# Patient Record
Sex: MALE | Race: OTHER | HISPANIC OR LATINO
[De-identification: names, ages, dates, MRNs, and addresses within clinical notes are randomized per-mention and may not be internally consistent; named-entity substitution may affect disease eponyms.]

---

## 2018-03-22 ENCOUNTER — APPOINTMENT (OUTPATIENT)
Dept: INTERNAL MEDICINE | Facility: CLINIC | Age: 43
End: 2018-03-22

## 2018-03-22 ENCOUNTER — LABORATORY RESULT (OUTPATIENT)
Age: 43
End: 2018-03-22

## 2018-03-22 ENCOUNTER — OUTPATIENT (OUTPATIENT)
Dept: OUTPATIENT SERVICES | Facility: HOSPITAL | Age: 43
LOS: 1 days | End: 2018-03-22
Payer: SELF-PAY

## 2018-03-22 VITALS
SYSTOLIC BLOOD PRESSURE: 120 MMHG | BODY MASS INDEX: 25.71 KG/M2 | HEIGHT: 73 IN | WEIGHT: 194 LBS | DIASTOLIC BLOOD PRESSURE: 90 MMHG

## 2018-03-22 DIAGNOSIS — H02.402 UNSPECIFIED PTOSIS OF LEFT EYELID: ICD-10-CM

## 2018-03-22 DIAGNOSIS — K42.9 UMBILICAL HERNIA WITHOUT OBSTRUCTION OR GANGRENE: Chronic | ICD-10-CM

## 2018-03-22 DIAGNOSIS — I10 ESSENTIAL (PRIMARY) HYPERTENSION: ICD-10-CM

## 2018-03-22 LAB
HCT VFR BLD CALC: 48.4 % — SIGNIFICANT CHANGE UP (ref 39–50)
HGB BLD-MCNC: 15.9 G/DL — SIGNIFICANT CHANGE UP (ref 13–17)
MCHC RBC-ENTMCNC: 30 PG — SIGNIFICANT CHANGE UP (ref 27–34)
MCHC RBC-ENTMCNC: 32.9 GM/DL — SIGNIFICANT CHANGE UP (ref 32–36)
MCV RBC AUTO: 91.3 FL — SIGNIFICANT CHANGE UP (ref 80–100)
NRBC # BLD: 0 /100 WBCS — SIGNIFICANT CHANGE UP (ref 0–0)
PLATELET # BLD AUTO: 265 K/UL — SIGNIFICANT CHANGE UP (ref 150–400)
RBC # BLD: 5.3 M/UL — SIGNIFICANT CHANGE UP (ref 4.2–5.8)
RBC # FLD: 12.7 % — SIGNIFICANT CHANGE UP (ref 10.3–14.5)
WBC # BLD: 7.37 K/UL — SIGNIFICANT CHANGE UP (ref 3.8–10.5)
WBC # FLD AUTO: 7.37 K/UL — SIGNIFICANT CHANGE UP (ref 3.8–10.5)

## 2018-03-22 PROCEDURE — 85027 COMPLETE CBC AUTOMATED: CPT

## 2018-03-22 PROCEDURE — 82607 VITAMIN B-12: CPT

## 2018-03-22 PROCEDURE — G0463: CPT

## 2018-03-22 PROCEDURE — 80053 COMPREHEN METABOLIC PANEL: CPT

## 2018-03-22 PROCEDURE — 83036 HEMOGLOBIN GLYCOSYLATED A1C: CPT

## 2018-03-22 PROCEDURE — 80061 LIPID PANEL: CPT

## 2018-03-23 LAB
ALBUMIN SERPL ELPH-MCNC: 4.7 G/DL — SIGNIFICANT CHANGE UP (ref 3.3–5)
ALP SERPL-CCNC: 62 U/L — SIGNIFICANT CHANGE UP (ref 40–120)
ALT FLD-CCNC: 37 U/L — SIGNIFICANT CHANGE UP (ref 10–45)
ANION GAP SERPL CALC-SCNC: 16 MMOL/L — SIGNIFICANT CHANGE UP (ref 5–17)
AST SERPL-CCNC: 46 U/L — HIGH (ref 10–40)
BILIRUB SERPL-MCNC: 0.6 MG/DL — SIGNIFICANT CHANGE UP (ref 0.2–1.2)
BUN SERPL-MCNC: 25 MG/DL — HIGH (ref 7–23)
CALCIUM SERPL-MCNC: 9.8 MG/DL — SIGNIFICANT CHANGE UP (ref 8.4–10.5)
CHLORIDE SERPL-SCNC: 100 MMOL/L — SIGNIFICANT CHANGE UP (ref 96–108)
CHOLEST SERPL-MCNC: 199 MG/DL — SIGNIFICANT CHANGE UP (ref 10–199)
CO2 SERPL-SCNC: 25 MMOL/L — SIGNIFICANT CHANGE UP (ref 22–31)
CREAT SERPL-MCNC: 0.94 MG/DL — SIGNIFICANT CHANGE UP (ref 0.5–1.3)
FOLATE SERPL-MCNC: >20 NG/ML — SIGNIFICANT CHANGE UP (ref 4.8–24.2)
GLUCOSE SERPL-MCNC: 78 MG/DL — SIGNIFICANT CHANGE UP (ref 70–99)
HBA1C BLD-MCNC: 5.7 % — HIGH (ref 4–5.6)
HDLC SERPL-MCNC: 37 MG/DL — LOW (ref 40–125)
LIPID PNL WITH DIRECT LDL SERPL: 140 MG/DL — HIGH
POTASSIUM SERPL-MCNC: 4.3 MMOL/L — SIGNIFICANT CHANGE UP (ref 3.5–5.3)
POTASSIUM SERPL-SCNC: 4.3 MMOL/L — SIGNIFICANT CHANGE UP (ref 3.5–5.3)
PROT SERPL-MCNC: 7.1 G/DL — SIGNIFICANT CHANGE UP (ref 6–8.3)
SODIUM SERPL-SCNC: 141 MMOL/L — SIGNIFICANT CHANGE UP (ref 135–145)
TOTAL CHOLESTEROL/HDL RATIO MEASUREMENT: 5.4 RATIO — SIGNIFICANT CHANGE UP (ref 3.4–9.6)
TRIGL SERPL-MCNC: 111 MG/DL — SIGNIFICANT CHANGE UP (ref 10–149)
VIT B12 SERPL-MCNC: 359 PG/ML — SIGNIFICANT CHANGE UP (ref 232–1245)

## 2018-03-29 DIAGNOSIS — H02.402 UNSPECIFIED PTOSIS OF LEFT EYELID: ICD-10-CM

## 2018-03-29 DIAGNOSIS — R20.2 PARESTHESIA OF SKIN: ICD-10-CM

## 2018-03-30 ENCOUNTER — FORM ENCOUNTER (OUTPATIENT)
Age: 43
End: 2018-03-30

## 2018-03-31 ENCOUNTER — OUTPATIENT (OUTPATIENT)
Dept: OUTPATIENT SERVICES | Facility: HOSPITAL | Age: 43
LOS: 1 days | End: 2018-03-31
Payer: SELF-PAY

## 2018-03-31 ENCOUNTER — APPOINTMENT (OUTPATIENT)
Dept: MRI IMAGING | Facility: CLINIC | Age: 43
End: 2018-03-31
Payer: COMMERCIAL

## 2018-03-31 DIAGNOSIS — H02.402 UNSPECIFIED PTOSIS OF LEFT EYELID: ICD-10-CM

## 2018-03-31 DIAGNOSIS — K42.9 UMBILICAL HERNIA WITHOUT OBSTRUCTION OR GANGRENE: Chronic | ICD-10-CM

## 2018-03-31 DIAGNOSIS — R20.2 PARESTHESIA OF SKIN: ICD-10-CM

## 2018-03-31 PROCEDURE — 70551 MRI BRAIN STEM W/O DYE: CPT

## 2018-03-31 PROCEDURE — 70551 MRI BRAIN STEM W/O DYE: CPT | Mod: 26

## 2018-05-08 ENCOUNTER — APPOINTMENT (OUTPATIENT)
Dept: NEUROLOGY | Facility: HOSPITAL | Age: 43
End: 2018-05-08
Payer: COMMERCIAL

## 2018-05-08 ENCOUNTER — OUTPATIENT (OUTPATIENT)
Dept: OUTPATIENT SERVICES | Facility: HOSPITAL | Age: 43
LOS: 1 days | End: 2018-05-08
Payer: SELF-PAY

## 2018-05-08 VITALS
HEART RATE: 70 BPM | HEIGHT: 73 IN | BODY MASS INDEX: 25.71 KG/M2 | DIASTOLIC BLOOD PRESSURE: 88 MMHG | SYSTOLIC BLOOD PRESSURE: 124 MMHG | WEIGHT: 194 LBS | RESPIRATION RATE: 16 BRPM

## 2018-05-08 DIAGNOSIS — G44.89 OTHER HEADACHE SYNDROME: ICD-10-CM

## 2018-05-08 DIAGNOSIS — R56.9 UNSPECIFIED CONVULSIONS: ICD-10-CM

## 2018-05-08 DIAGNOSIS — K42.9 UMBILICAL HERNIA WITHOUT OBSTRUCTION OR GANGRENE: Chronic | ICD-10-CM

## 2018-05-08 DIAGNOSIS — R20.2 PARESTHESIA OF SKIN: ICD-10-CM

## 2018-05-08 PROCEDURE — 99203 OFFICE O/P NEW LOW 30 MIN: CPT | Mod: GC

## 2018-05-08 PROCEDURE — 83655 ASSAY OF LEAD: CPT

## 2018-05-08 PROCEDURE — 82784 ASSAY IGA/IGD/IGG/IGM EACH: CPT

## 2018-05-08 PROCEDURE — 82300 ASSAY OF CADMIUM: CPT

## 2018-05-08 PROCEDURE — 82525 ASSAY OF COPPER: CPT

## 2018-05-08 PROCEDURE — 86701 HIV-1ANTIBODY: CPT

## 2018-05-08 PROCEDURE — 87389 HIV-1 AG W/HIV-1&-2 AB AG IA: CPT

## 2018-05-08 PROCEDURE — G0463: CPT

## 2018-05-08 PROCEDURE — 83825 ASSAY OF MERCURY: CPT

## 2018-05-08 PROCEDURE — 82175 ASSAY OF ARSENIC: CPT

## 2018-05-21 ENCOUNTER — FORM ENCOUNTER (OUTPATIENT)
Age: 43
End: 2018-05-21

## 2018-05-22 ENCOUNTER — OUTPATIENT (OUTPATIENT)
Dept: OUTPATIENT SERVICES | Facility: HOSPITAL | Age: 43
LOS: 1 days | End: 2018-05-22
Payer: SELF-PAY

## 2018-05-22 ENCOUNTER — APPOINTMENT (OUTPATIENT)
Dept: MRI IMAGING | Facility: CLINIC | Age: 43
End: 2018-05-22
Payer: COMMERCIAL

## 2018-05-22 DIAGNOSIS — G44.89 OTHER HEADACHE SYNDROME: ICD-10-CM

## 2018-05-22 DIAGNOSIS — R20.2 PARESTHESIA OF SKIN: ICD-10-CM

## 2018-05-22 DIAGNOSIS — K42.9 UMBILICAL HERNIA WITHOUT OBSTRUCTION OR GANGRENE: Chronic | ICD-10-CM

## 2018-05-22 PROCEDURE — A9585: CPT

## 2018-05-22 PROCEDURE — 72156 MRI NECK SPINE W/O & W/DYE: CPT | Mod: 26

## 2018-05-22 PROCEDURE — 72156 MRI NECK SPINE W/O & W/DYE: CPT

## 2018-05-22 PROCEDURE — 70553 MRI BRAIN STEM W/O & W/DYE: CPT | Mod: 26

## 2018-05-22 PROCEDURE — 70553 MRI BRAIN STEM W/O & W/DYE: CPT

## 2018-05-29 ENCOUNTER — RESULT REVIEW (OUTPATIENT)
Age: 43
End: 2018-05-29

## 2018-06-05 ENCOUNTER — OUTPATIENT (OUTPATIENT)
Dept: OUTPATIENT SERVICES | Facility: HOSPITAL | Age: 43
LOS: 1 days | End: 2018-06-05
Payer: SELF-PAY

## 2018-06-05 ENCOUNTER — APPOINTMENT (OUTPATIENT)
Dept: NEUROLOGY | Facility: HOSPITAL | Age: 43
End: 2018-06-05
Payer: COMMERCIAL

## 2018-06-05 VITALS
SYSTOLIC BLOOD PRESSURE: 120 MMHG | BODY MASS INDEX: 25.98 KG/M2 | DIASTOLIC BLOOD PRESSURE: 83 MMHG | RESPIRATION RATE: 14 BRPM | WEIGHT: 196 LBS | HEIGHT: 73 IN | HEART RATE: 68 BPM

## 2018-06-05 DIAGNOSIS — K42.9 UMBILICAL HERNIA WITHOUT OBSTRUCTION OR GANGRENE: Chronic | ICD-10-CM

## 2018-06-05 DIAGNOSIS — R56.9 UNSPECIFIED CONVULSIONS: ICD-10-CM

## 2018-06-05 DIAGNOSIS — R20.2 PARESTHESIA OF SKIN: ICD-10-CM

## 2018-06-05 PROCEDURE — 99214 OFFICE O/P EST MOD 30 MIN: CPT | Mod: GC

## 2018-06-05 PROCEDURE — G0463: CPT

## 2018-06-05 RX ORDER — NORTRIPTYLINE HYDROCHLORIDE 10 MG/1
10 CAPSULE ORAL
Qty: 30 | Refills: 2 | Status: DISCONTINUED | COMMUNITY
Start: 2018-05-08 | End: 2018-06-05

## 2018-07-26 ENCOUNTER — APPOINTMENT (OUTPATIENT)
Dept: NEUROLOGY | Facility: CLINIC | Age: 43
End: 2018-07-26
Payer: SELF-PAY

## 2018-07-26 PROCEDURE — 95908 NRV CNDJ TST 3-4 STUDIES: CPT

## 2018-07-26 PROCEDURE — 95886 MUSC TEST DONE W/N TEST COMP: CPT

## 2018-07-31 ENCOUNTER — APPOINTMENT (OUTPATIENT)
Dept: NEUROLOGY | Facility: HOSPITAL | Age: 43
End: 2018-07-31

## 2018-07-31 ENCOUNTER — OUTPATIENT (OUTPATIENT)
Dept: OUTPATIENT SERVICES | Facility: HOSPITAL | Age: 43
LOS: 1 days | End: 2018-07-31
Payer: SELF-PAY

## 2018-07-31 VITALS
BODY MASS INDEX: 25.84 KG/M2 | SYSTOLIC BLOOD PRESSURE: 125 MMHG | WEIGHT: 195 LBS | HEIGHT: 73 IN | DIASTOLIC BLOOD PRESSURE: 90 MMHG | HEART RATE: 73 BPM | RESPIRATION RATE: 14 BRPM

## 2018-07-31 DIAGNOSIS — K42.9 UMBILICAL HERNIA WITHOUT OBSTRUCTION OR GANGRENE: Chronic | ICD-10-CM

## 2018-07-31 DIAGNOSIS — R20.2 PARESTHESIA OF SKIN: ICD-10-CM

## 2018-07-31 DIAGNOSIS — R56.9 UNSPECIFIED CONVULSIONS: ICD-10-CM

## 2018-07-31 DIAGNOSIS — M54.12 RADICULOPATHY, CERVICAL REGION: ICD-10-CM

## 2018-07-31 PROCEDURE — G0463: CPT

## 2018-07-31 RX ORDER — IBUPROFEN 800 MG/1
800 TABLET, FILM COATED ORAL
Qty: 42 | Refills: 0 | Status: DISCONTINUED | COMMUNITY
Start: 2018-03-13 | End: 2018-07-31

## 2018-10-04 ENCOUNTER — OUTPATIENT (OUTPATIENT)
Dept: OUTPATIENT SERVICES | Facility: HOSPITAL | Age: 43
LOS: 1 days | End: 2018-10-04
Payer: SELF-PAY

## 2018-10-04 ENCOUNTER — APPOINTMENT (OUTPATIENT)
Dept: NEUROLOGY | Facility: HOSPITAL | Age: 43
End: 2018-10-04

## 2018-10-04 VITALS
HEIGHT: 73 IN | DIASTOLIC BLOOD PRESSURE: 81 MMHG | BODY MASS INDEX: 27.43 KG/M2 | SYSTOLIC BLOOD PRESSURE: 115 MMHG | WEIGHT: 207 LBS | HEART RATE: 77 BPM

## 2018-10-04 DIAGNOSIS — R20.2 PARESTHESIA OF SKIN: ICD-10-CM

## 2018-10-04 DIAGNOSIS — K42.9 UMBILICAL HERNIA WITHOUT OBSTRUCTION OR GANGRENE: Chronic | ICD-10-CM

## 2018-10-04 DIAGNOSIS — R51 HEADACHE: ICD-10-CM

## 2018-10-04 PROCEDURE — G0463: CPT

## 2018-10-09 ENCOUNTER — OUTPATIENT (OUTPATIENT)
Dept: OUTPATIENT SERVICES | Facility: HOSPITAL | Age: 43
LOS: 1 days | End: 2018-10-09
Payer: SELF-PAY

## 2018-10-09 DIAGNOSIS — K42.9 UMBILICAL HERNIA WITHOUT OBSTRUCTION OR GANGRENE: Chronic | ICD-10-CM

## 2018-10-09 DIAGNOSIS — R20.2 PARESTHESIA OF SKIN: ICD-10-CM

## 2018-10-09 PROCEDURE — 71046 X-RAY EXAM CHEST 2 VIEWS: CPT

## 2018-10-09 PROCEDURE — 71046 X-RAY EXAM CHEST 2 VIEWS: CPT | Mod: 26

## 2018-11-23 ENCOUNTER — OUTPATIENT (OUTPATIENT)
Dept: OUTPATIENT SERVICES | Facility: HOSPITAL | Age: 43
LOS: 1 days | End: 2018-11-23
Payer: SELF-PAY

## 2018-11-23 ENCOUNTER — APPOINTMENT (OUTPATIENT)
Dept: INTERNAL MEDICINE | Facility: CLINIC | Age: 43
End: 2018-11-23

## 2018-11-23 VITALS
WEIGHT: 215 LBS | OXYGEN SATURATION: 96 % | HEART RATE: 80 BPM | SYSTOLIC BLOOD PRESSURE: 110 MMHG | BODY MASS INDEX: 28.49 KG/M2 | DIASTOLIC BLOOD PRESSURE: 80 MMHG | HEIGHT: 73 IN

## 2018-11-23 DIAGNOSIS — I10 ESSENTIAL (PRIMARY) HYPERTENSION: ICD-10-CM

## 2018-11-23 DIAGNOSIS — N46.9 MALE INFERTILITY, UNSPECIFIED: ICD-10-CM

## 2018-11-23 DIAGNOSIS — K42.9 UMBILICAL HERNIA WITHOUT OBSTRUCTION OR GANGRENE: Chronic | ICD-10-CM

## 2018-11-23 PROCEDURE — 90656 IIV3 VACC NO PRSV 0.5 ML IM: CPT

## 2018-11-23 PROCEDURE — G0008: CPT

## 2018-11-23 PROCEDURE — G0463: CPT

## 2018-11-23 NOTE — HISTORY OF PRESENT ILLNESS
[FreeTextEntry1] : Follow up [de-identified] : This is a 44 y/o M with a PMH of anal fistulotomy who presents for a follow up visit. He was referred to neurology for R sided numbness and tingling a few months ago. He has no focal deficits but has some increased sensitivity to temperature, R >L. Work up including MRI brain, MR C-spine, and EMG have all been negative. He says lifting his head above his arms alleviates his symptoms. CXR was done for consideration of thoracic outlet syndrome but also negative. \par \par The symptoms seem to be worse when working but they come and go. He says they are manageable but heard that steroid injections could possibly alleviate his symptoms. \par \par He also says that him and his wife have been trying to get pregnant for the last 4 years. They have intercourse 3-4x per week. His wife went to see her OBGYN and was tested for infertility and everything was normal. He is interested in getting his sperm analysis.

## 2018-11-23 NOTE — REVIEW OF SYSTEMS
[Muscle Pain] : muscle pain [Fever] : no fever [Chills] : no chills [Vision Problems] : no vision problems [Chest Pain] : no chest pain [Shortness Of Breath] : no shortness of breath [Abdominal Pain] : no abdominal pain [Vomiting] : no vomiting [Joint Pain] : no joint pain [Muscle Weakness] : no muscle weakness

## 2018-11-23 NOTE — PHYSICAL EXAM
[No Acute Distress] : no acute distress [Well Nourished] : well nourished [No Respiratory Distress] : no respiratory distress  [Clear to Auscultation] : lungs were clear to auscultation bilaterally [Normal Rate] : normal rate  [Regular Rhythm] : with a regular rhythm [Normal S1, S2] : normal S1 and S2 [No Murmur] : no murmur heard [Grossly Normal Strength/Tone] : grossly normal strength/tone [Normal Gait] : normal gait [Normal Affect] : the affect was normal [Alert and Oriented x3] : oriented to person, place, and time [de-identified] : 2+ palpable radial pulses bilaterally

## 2018-11-23 NOTE — ASSESSMENT
[FreeTextEntry1] : This is a 44 y/o M with a PMH of anal fistulotomy who presents for a follow up visit.\par \par 1) Paraesthesias\par - neurology workup negative thus far\par - will refer to orthopedic surgery to assess for rotator cuff tear given physical nature of work\par - shoulder xray prior to ortho visit \par - continue gabapentin as needed\par - neurology follow up in January \par \par 2) Infertility\par - will refer to reproductive endocrinology for further workup\par \par 3) HCM\par - dental referral given\par - flu shot given   \par - chest xray with LLL granuloma, repeat xray in 1 year to assess for change \par \par RTC

## 2018-11-25 ENCOUNTER — FORM ENCOUNTER (OUTPATIENT)
Age: 43
End: 2018-11-25

## 2018-11-26 ENCOUNTER — APPOINTMENT (OUTPATIENT)
Dept: RADIOLOGY | Facility: CLINIC | Age: 43
End: 2018-11-26
Payer: COMMERCIAL

## 2018-11-26 ENCOUNTER — OUTPATIENT (OUTPATIENT)
Dept: OUTPATIENT SERVICES | Facility: HOSPITAL | Age: 43
LOS: 1 days | End: 2018-11-26
Payer: SELF-PAY

## 2018-11-26 DIAGNOSIS — M54.12 RADICULOPATHY, CERVICAL REGION: ICD-10-CM

## 2018-11-26 DIAGNOSIS — I10 ESSENTIAL (PRIMARY) HYPERTENSION: ICD-10-CM

## 2018-11-26 DIAGNOSIS — K42.9 UMBILICAL HERNIA WITHOUT OBSTRUCTION OR GANGRENE: Chronic | ICD-10-CM

## 2018-11-26 PROCEDURE — 73030 X-RAY EXAM OF SHOULDER: CPT

## 2018-11-26 PROCEDURE — 73030 X-RAY EXAM OF SHOULDER: CPT | Mod: 26,RT

## 2018-12-05 ENCOUNTER — APPOINTMENT (OUTPATIENT)
Dept: ORTHOPEDIC SURGERY | Facility: HOSPITAL | Age: 43
End: 2018-12-05

## 2018-12-05 ENCOUNTER — OUTPATIENT (OUTPATIENT)
Dept: OUTPATIENT SERVICES | Facility: HOSPITAL | Age: 43
LOS: 1 days | End: 2018-12-05
Payer: SELF-PAY

## 2018-12-05 VITALS
HEART RATE: 75 BPM | WEIGHT: 215 LBS | BODY MASS INDEX: 28.49 KG/M2 | SYSTOLIC BLOOD PRESSURE: 125 MMHG | HEIGHT: 73 IN | DIASTOLIC BLOOD PRESSURE: 90 MMHG

## 2018-12-05 DIAGNOSIS — M25.511 PAIN IN RIGHT SHOULDER: ICD-10-CM

## 2018-12-05 DIAGNOSIS — K42.9 UMBILICAL HERNIA WITHOUT OBSTRUCTION OR GANGRENE: Chronic | ICD-10-CM

## 2018-12-05 DIAGNOSIS — M79.609 PAIN IN UNSPECIFIED LIMB: ICD-10-CM

## 2018-12-05 PROCEDURE — G0463: CPT

## 2018-12-11 ENCOUNTER — APPOINTMENT (OUTPATIENT)
Dept: HUMAN REPRODUCTION | Facility: CLINIC | Age: 43
End: 2018-12-11
Payer: SELF-PAY

## 2018-12-11 PROCEDURE — 89322 SEMEN ANAL STRICT CRITERIA: CPT

## 2018-12-17 ENCOUNTER — FORM ENCOUNTER (OUTPATIENT)
Age: 43
End: 2018-12-17

## 2018-12-18 ENCOUNTER — APPOINTMENT (OUTPATIENT)
Dept: MRI IMAGING | Facility: CLINIC | Age: 43
End: 2018-12-18
Payer: COMMERCIAL

## 2018-12-18 ENCOUNTER — OUTPATIENT (OUTPATIENT)
Dept: OUTPATIENT SERVICES | Facility: HOSPITAL | Age: 43
LOS: 1 days | End: 2018-12-18
Payer: SELF-PAY

## 2018-12-18 DIAGNOSIS — K42.9 UMBILICAL HERNIA WITHOUT OBSTRUCTION OR GANGRENE: Chronic | ICD-10-CM

## 2018-12-18 DIAGNOSIS — M25.511 PAIN IN RIGHT SHOULDER: ICD-10-CM

## 2018-12-18 PROCEDURE — 71550 MRI CHEST W/O DYE: CPT | Mod: 26

## 2018-12-18 PROCEDURE — 73221 MRI JOINT UPR EXTREM W/O DYE: CPT | Mod: 26,RT

## 2018-12-18 PROCEDURE — 73221 MRI JOINT UPR EXTREM W/O DYE: CPT

## 2018-12-18 PROCEDURE — 71550 MRI CHEST W/O DYE: CPT

## 2018-12-19 ENCOUNTER — OUTPATIENT (OUTPATIENT)
Dept: OUTPATIENT SERVICES | Facility: HOSPITAL | Age: 43
LOS: 1 days | End: 2018-12-19
Payer: SELF-PAY

## 2018-12-19 ENCOUNTER — APPOINTMENT (OUTPATIENT)
Dept: ORTHOPEDIC SURGERY | Facility: HOSPITAL | Age: 43
End: 2018-12-19

## 2018-12-19 VITALS
DIASTOLIC BLOOD PRESSURE: 79 MMHG | RESPIRATION RATE: 16 BRPM | HEART RATE: 91 BPM | WEIGHT: 215 LBS | BODY MASS INDEX: 28.49 KG/M2 | SYSTOLIC BLOOD PRESSURE: 120 MMHG | HEIGHT: 73 IN

## 2018-12-19 DIAGNOSIS — K42.9 UMBILICAL HERNIA WITHOUT OBSTRUCTION OR GANGRENE: Chronic | ICD-10-CM

## 2018-12-19 DIAGNOSIS — M79.643 PAIN IN UNSPECIFIED HAND: ICD-10-CM

## 2018-12-19 PROCEDURE — G0463: CPT

## 2018-12-21 DIAGNOSIS — M25.511 PAIN IN RIGHT SHOULDER: ICD-10-CM

## 2019-01-22 ENCOUNTER — APPOINTMENT (OUTPATIENT)
Dept: NEUROLOGY | Facility: HOSPITAL | Age: 44
End: 2019-01-22

## 2019-01-22 ENCOUNTER — OUTPATIENT (OUTPATIENT)
Dept: OUTPATIENT SERVICES | Facility: HOSPITAL | Age: 44
LOS: 1 days | End: 2019-01-22
Payer: SELF-PAY

## 2019-01-22 VITALS
HEIGHT: 73 IN | HEART RATE: 79 BPM | DIASTOLIC BLOOD PRESSURE: 91 MMHG | SYSTOLIC BLOOD PRESSURE: 126 MMHG | WEIGHT: 210 LBS | BODY MASS INDEX: 27.83 KG/M2

## 2019-01-22 DIAGNOSIS — R51 HEADACHE: ICD-10-CM

## 2019-01-22 DIAGNOSIS — E06.9 THYROIDITIS, UNSPECIFIED: ICD-10-CM

## 2019-01-22 DIAGNOSIS — R20.2 PARESTHESIA OF SKIN: ICD-10-CM

## 2019-01-22 DIAGNOSIS — K42.9 UMBILICAL HERNIA WITHOUT OBSTRUCTION OR GANGRENE: Chronic | ICD-10-CM

## 2019-01-22 PROCEDURE — G0463: CPT

## 2019-01-22 NOTE — DISCUSSION/SUMMARY
[FreeTextEntry1] : 43 y.o. M with PMH of anal fistulotomy p/w initial evaluation after referral from medicine for\par R sided numbness and tingling.\par On exam no focal weakness, slightly increase sensitivity to temperature R>L. p\par MRI Brain wnl, MR c-spine mild findings which would not explain symptoms, EMG negative. Chest Xray negative\par \par Plan:\par -Referral for PMR and Rheumatology\par -C/W Physical therapy\par -Can stop gabapentin since patient states its not helping him, can restart if pain gets worse\par -F/U in 3 months after seeing Rheum and PMR

## 2019-01-22 NOTE — PHYSICAL EXAM
[General Appearance - Alert] : alert [General Appearance - In No Acute Distress] : in no acute distress [Oriented To Time, Place, And Person] : oriented to person, place, and time [Impaired Insight] : insight and judgment were intact [Person] : oriented to person [Place] : oriented to place [Time] : oriented to time [Short Term Intact] : short term memory intact [Remote Intact] : remote memory intact [Registration Intact] : recent registration memory intact [Span Intact] : the attention span was normal [Concentration Intact] : normal concentrating ability [Visual Intact] : visual attention was ~T not ~L decreased [I: Normal Smell] : smell intact bilaterally [Cranial Nerves Optic (II)] : visual acuity intact bilaterally,  visual fields full to confrontation, pupils equal round and reactive to light [Cranial Nerves Oculomotor (III)] : extraocular motion intact [Cranial Nerves Trigeminal (V)] : facial sensation intact symmetrically [Cranial Nerves Facial (VII)] : face symmetrical [Cranial Nerves Vestibulocochlear (VIII)] : hearing was intact bilaterally [Cranial Nerves Glossopharyngeal (IX)] : tongue and palate midline [Cranial Nerves Accessory (XI - Cranial And Spinal)] : head turning and shoulder shrug symmetric [Cranial Nerves Hypoglossal (XII)] : there was no tongue deviation with protrusion [Motor Tone] : muscle tone was normal in all four extremities [Motor Strength] : muscle strength was normal in all four extremities [Motor Handedness Right-Handed] : the patient is right hand dominant [Sensation Vibration Decrease] : vibration was intact [Proprioception] : proprioception was intact [Abnormal Walk] : normal gait [Balance] : balance was intact [2+] : Patella left 2+ [Sclera] : the sclera and conjunctiva were normal [PERRL With Normal Accommodation] : pupils were equal in size, round, reactive to light, with normal accommodation [Outer Ear] : the ears and nose were normal in appearance [Neck Appearance] : the appearance of the neck was normal [] : no rash [Skin Lesions] : no skin lesions [FreeTextEntry1] : MS: AAOx3, fluent speech, follows commands\par CN: EOMI, V1-V3 intact, facial symmetry intact, no dysarthia\par motor: 5/5 throughout, no weakness illicited  in right hand but has pain when makes a fist\par sensory: intact to LT throughout, decreased sensation to PP on lateral aspect of wrist and forearm\par relfexes: 2+ biceps and brachioradialis b/l, 2+ patellar b/l [Motor Strength Upper Extremities Bilaterally] : strength was normal in both upper extremities [Motor Strength Lower Extremities Bilaterally] : strength was normal in both lower extremities [Limited Balance] : balance was intact [Past-pointing] : there was no past-pointing [FreeTextEntry7] : cold sensation R>L, decreased sensation over R hand

## 2019-01-22 NOTE — DATA REVIEWED
[No studies available for review at this time.] : No studies available for review at this time. [de-identified] : negative, MRi C-spine:\par FINDINGS: The cervical cord is normal in signal. The cervical medullary \par junction is normal. Following the administration of intravenous gadolinium, \par there are no enhancing lesions within the cervical cord or leptomeninges. \par Vertebral body heights are maintained. There is straightening of the normal \par cervical lordosis. Alignment is otherwise normal. \par \par There is a small left paracentral disc protrusion at C5-C6 which minimally \par effaces the anterior thecal sac. There is a very small central disc \par protrusion at C6/C7 which minimally effaces the anterior thecal sac. There \par are no other bulging or herniated intervertebral discs at any level. There \par is no central canal stenosis or foraminal narrowing at any level. \par \par IMPRESSION: Normal cord signal. \par \par Mild cervical spondylosis, as above. No central canal stenosis or foraminal \par narrowing.

## 2019-01-22 NOTE — HISTORY OF PRESENT ILLNESS
[FreeTextEntry1] : 1/22/2019: Patient presents today for follow up. States that the gabapentin and PT has not helped him at all. Pain and tingling sensation is still present and goes down from the shoulder to the arm and sometimes up the neck to the ear. Also radiates down the right back and to the right leg. States that when he makes a fist with his right hand that elicited pain. He has had these symptoms for almost 1 year.\par \par Interval Hx: Pt states gabapentin and PT helping somewhat but does not completely alleviate the pain , but not fully still endorses pain from right shoulder down to hand. Pt states at times right side of his back feels cold to tough, Hyperesthesias in the right hand. Denies weakness. states when he presses his right upper arm his symtoms areworse\par \par \par Prior Hx: 42 y.o. M with PMH of anal fistulotomy p/w initial evaluation after referral from medicine for\par R sided numbness and tingling .One month prior while he was at work (worksn a [employer]) he noticed a change in sensation on the R side of his body, and lost temperature sensations in his R hand, first three digits, associated with pain. Associated symptoms included headache, numbness, and tingling.  He took Tylenol for this pain and it improved. Thereafter he noticed  numbness and tingling in the right side of his body including his arm, side and leg up till his ankle that continued and described as the right side is "falling asleep". Patient visit urgent care where he was prescribed ibuprofen.  He no longer has any pain in his neck or head but the tingling and numbness has persisted in the right side of his body. He denies any trouble walking, visual disturbances, bowel/bladder incontinence, dizziness, or fevers. He denies any recent travel, accidents, trauma. He denies any FH of neurological disease. He had an MRI Brain w/o contrast completed with no acute findings. In addition s/p CBc, CMP lipid profile Hba1c, B12/folate within normal limits.  \par

## 2019-01-31 ENCOUNTER — APPOINTMENT (OUTPATIENT)
Dept: INTERNAL MEDICINE | Facility: CLINIC | Age: 44
End: 2019-01-31

## 2019-02-01 ENCOUNTER — APPOINTMENT (OUTPATIENT)
Dept: INTERNAL MEDICINE | Facility: CLINIC | Age: 44
End: 2019-02-01

## 2019-02-01 ENCOUNTER — OUTPATIENT (OUTPATIENT)
Dept: OUTPATIENT SERVICES | Facility: HOSPITAL | Age: 44
LOS: 1 days | End: 2019-02-01
Payer: SELF-PAY

## 2019-02-01 VITALS
SYSTOLIC BLOOD PRESSURE: 130 MMHG | BODY MASS INDEX: 28.23 KG/M2 | HEIGHT: 73 IN | WEIGHT: 213 LBS | DIASTOLIC BLOOD PRESSURE: 80 MMHG

## 2019-02-01 DIAGNOSIS — K42.9 UMBILICAL HERNIA WITHOUT OBSTRUCTION OR GANGRENE: Chronic | ICD-10-CM

## 2019-02-01 DIAGNOSIS — I10 ESSENTIAL (PRIMARY) HYPERTENSION: ICD-10-CM

## 2019-02-01 PROCEDURE — G0463: CPT

## 2019-02-01 NOTE — PHYSICAL EXAM
[No Acute Distress] : no acute distress [Well Nourished] : well nourished [Clear to Auscultation] : lungs were clear to auscultation bilaterally [Normal Rate] : normal rate  [Regular Rhythm] : with a regular rhythm [Grossly Normal Strength/Tone] : grossly normal strength/tone [No Focal Deficits] : no focal deficits [Alert and Oriented x3] : oriented to person, place, and time

## 2019-02-04 NOTE — ASSESSMENT
[FreeTextEntry1] : This is a 42 y/o M with a PMH of anal fistulotomy who presents for an acute visit for referral to reproductive endocrinology for abnormal fertility tests.\par \par 1) Abnormal Semen Analysis\par - referral given to urology and reproductive endocrinology for follow up on abnormal semen analysis\par - if the patient is unable to get help at these two clinics, will follow up at the [facility] infertility clinic\par \par 2) HCM\par - RTC for CPE next month

## 2019-02-04 NOTE — HISTORY OF PRESENT ILLNESS
[FreeTextEntry8] : This is a 44 y/o M with a PMH of anal fistulotomy who presents for an acute visit. \par \par At his last visit, he was referred to reproductive endocrinology because him and his wife were trying to conceive and were unable to do so. His semen analysis results were abnormal and he is requesting a referral to go back to reproductive endocrinology in order to be evaluated further.  \par \par In terms of his chronic R sided arm tingling, he saw neurology on 1/22. Given the negative workup thus far, they referred him to PMR and rheumatology for further follow up. He also spoke with his uncle who is a doctor who explained to him that the symptoms he feels are probably a result of his work as he repeatedly uses his right arm.

## 2019-02-04 NOTE — REVIEW OF SYSTEMS
[Fever] : no fever [Chills] : no chills [Vision Problems] : no vision problems [Chest Pain] : no chest pain [Shortness Of Breath] : no shortness of breath [Abdominal Pain] : no abdominal pain [de-identified] : +numbness and tinling in the R arm

## 2019-02-14 DIAGNOSIS — N46.9 MALE INFERTILITY, UNSPECIFIED: ICD-10-CM

## 2019-03-01 ENCOUNTER — APPOINTMENT (OUTPATIENT)
Dept: RHEUMATOLOGY | Facility: HOSPITAL | Age: 44
End: 2019-03-01

## 2019-03-01 ENCOUNTER — OUTPATIENT (OUTPATIENT)
Dept: OUTPATIENT SERVICES | Facility: HOSPITAL | Age: 44
LOS: 1 days | End: 2019-03-01
Payer: SELF-PAY

## 2019-03-01 VITALS
BODY MASS INDEX: 28.49 KG/M2 | HEIGHT: 73 IN | HEART RATE: 76 BPM | RESPIRATION RATE: 14 BRPM | WEIGHT: 215 LBS | SYSTOLIC BLOOD PRESSURE: 128 MMHG | DIASTOLIC BLOOD PRESSURE: 78 MMHG

## 2019-03-01 DIAGNOSIS — K42.9 UMBILICAL HERNIA WITHOUT OBSTRUCTION OR GANGRENE: Chronic | ICD-10-CM

## 2019-03-01 DIAGNOSIS — M06.9 RHEUMATOID ARTHRITIS, UNSPECIFIED: ICD-10-CM

## 2019-03-01 DIAGNOSIS — R20.2 PARESTHESIA OF SKIN: ICD-10-CM

## 2019-03-01 LAB — 24R-OH-CALCIDIOL SERPL-MCNC: 15.7 NG/ML — LOW (ref 30–80)

## 2019-03-01 PROCEDURE — 84443 ASSAY THYROID STIM HORMONE: CPT

## 2019-03-01 PROCEDURE — 84207 ASSAY OF VITAMIN B-6: CPT

## 2019-03-01 PROCEDURE — 84425 ASSAY OF VITAMIN B-1: CPT

## 2019-03-01 PROCEDURE — 84252 ASSAY OF VITAMIN B-2: CPT

## 2019-03-01 PROCEDURE — 36415 COLL VENOUS BLD VENIPUNCTURE: CPT

## 2019-03-01 PROCEDURE — 82306 VITAMIN D 25 HYDROXY: CPT

## 2019-03-01 PROCEDURE — 82180 ASSAY OF ASCORBIC ACID: CPT

## 2019-03-01 PROCEDURE — 84591 ASSAY OF NOS VITAMIN: CPT

## 2019-03-01 PROCEDURE — 84590 ASSAY OF VITAMIN A: CPT

## 2019-03-01 PROCEDURE — G0463: CPT

## 2019-03-01 RX ORDER — GABAPENTIN 300 MG/1
300 CAPSULE ORAL 3 TIMES DAILY
Qty: 90 | Refills: 5 | Status: COMPLETED | COMMUNITY
Start: 2018-06-05 | End: 2019-03-01

## 2019-03-01 RX ORDER — NAPROXEN 500 MG/1
500 TABLET ORAL
Qty: 30 | Refills: 3 | Status: COMPLETED | COMMUNITY
Start: 2018-07-31 | End: 2019-03-01

## 2019-03-01 NOTE — PHYSICAL EXAM
[General Appearance - Well Nourished] : well nourished [General Appearance - Well Developed] : well developed [PERRL With Normal Accommodation] : pupils were equal in size, round, and reactive to light [Extraocular Movements] : extraocular movements were intact [Examination Of The Oral Cavity] : the lips and gums were normal [Oropharynx] : the oropharynx was normal [Thyroid Nodule] : there were no palpable thyroid nodules [Heart Sounds] : normal S1 and S2 [Heart Sounds Gallop] : no gallops [Murmurs] : no murmurs [Heart Sounds Pericardial Friction Rub] : no pericardial rub [Abdomen Soft] : soft [Abdomen Tenderness] : non-tender [Abdomen Mass (___ Cm)] : no abdominal mass palpated [Abdomen Hernia] : no hernia was discovered [Cervical Lymph Nodes Enlarged Posterior Bilaterally] : posterior cervical [Cervical Lymph Nodes Enlarged Anterior Bilaterally] : anterior cervical [Musculoskeletal - Swelling] : no joint swelling seen [Motor Tone] : muscle strength and tone were normal [Sensation] : the sensory exam was normal to light touch and pinprick [Motor Exam] : the motor exam was normal [Oriented To Time, Place, And Person] : oriented to person, place, and time [No Spinal Tenderness] : no spinal tenderness [] : no rash [FreeTextEntry1] : patellar pulse +2 b/l; finger to nose intact

## 2019-03-01 NOTE — ASSESSMENT
[FreeTextEntry1] : 42 y/o M w persistent R sided paresthesias\par -unclear etiology after extensive work up; MRI of brain/cervical spine/R shoulder/ R brachial plexus/EMG/folic acid/B12/heavy metal screening\par -will send out for vtamin B1, B2, B5, vitamin C, vitamin A, TSH\par -will refer to derm for possible skin bx for small fiber neuropathy (will touch base w derm)\par \par RTC 8-12 weeks\par \par D/w Dr. Oneill\par

## 2019-03-01 NOTE — CONSULT LETTER
[Consult Letter:] : I had the pleasure of evaluating your patient, [unfilled]. [Please see my note below.] : Please see my note below. [Consult Closing:] : Thank you very much for allowing me to participate in the care of this patient.  If you have any questions, please do not hesitate to contact me.

## 2019-03-02 LAB — TSH SERPL-MCNC: 3.17 UIU/ML — SIGNIFICANT CHANGE UP (ref 0.27–4.2)

## 2019-03-04 LAB — VIT B2 SERPL-MCNC: 191 UG/L — SIGNIFICANT CHANGE UP (ref 137–370)

## 2019-03-05 LAB
VIT A SERPL-MCNC: 79.4 UG/DL — HIGH (ref 20.1–62)
VIT B1 SERPL-MCNC: 153.3 NMOL/L — SIGNIFICANT CHANGE UP (ref 66.5–200)

## 2019-03-06 LAB
PANTOTHENATE SERPLBLD-MCNC: 60.9 NG/ML — SIGNIFICANT CHANGE UP (ref 12.9–253.1)
PYRIDOXAL PHOS SERPL-MCNC: 12.1 UG/L — SIGNIFICANT CHANGE UP (ref 5.3–46.7)
VIT C SERPL-MCNC: 0 MG/DL — LOW (ref 0.2–2)

## 2019-03-08 LAB
NICOTINAMIDE: 77.2 NG/ML — HIGH (ref 5.2–72.1)
NICOTINIC ACID: <5 NG/ML — SIGNIFICANT CHANGE UP (ref 0–5)

## 2019-03-17 DIAGNOSIS — E54 ASCORBIC ACID DEFICIENCY: ICD-10-CM

## 2019-04-01 ENCOUNTER — FORM ENCOUNTER (OUTPATIENT)
Age: 44
End: 2019-04-01

## 2019-04-01 ENCOUNTER — OUTPATIENT (OUTPATIENT)
Dept: OUTPATIENT SERVICES | Facility: HOSPITAL | Age: 44
LOS: 1 days | End: 2019-04-01
Payer: SELF-PAY

## 2019-04-01 ENCOUNTER — APPOINTMENT (OUTPATIENT)
Dept: UROLOGY | Facility: HOSPITAL | Age: 44
End: 2019-04-01

## 2019-04-01 VITALS
SYSTOLIC BLOOD PRESSURE: 124 MMHG | HEART RATE: 85 BPM | DIASTOLIC BLOOD PRESSURE: 85 MMHG | HEIGHT: 73 IN | WEIGHT: 215 LBS | BODY MASS INDEX: 28.49 KG/M2 | RESPIRATION RATE: 14 BRPM

## 2019-04-01 DIAGNOSIS — R30.0 DYSURIA: ICD-10-CM

## 2019-04-01 DIAGNOSIS — K42.9 UMBILICAL HERNIA WITHOUT OBSTRUCTION OR GANGRENE: Chronic | ICD-10-CM

## 2019-04-01 DIAGNOSIS — N46.9 MALE INFERTILITY, UNSPECIFIED: ICD-10-CM

## 2019-04-01 LAB
ESTRADIOL FREE SERPL-MCNC: 10 PG/ML — LOW (ref 11–43)
FSH SERPL-MCNC: 6.5 IU/L — SIGNIFICANT CHANGE UP (ref 1.5–12.4)
LH SERPL-ACNC: 6.7 IU/L — SIGNIFICANT CHANGE UP
PROLACTIN SERPL-MCNC: 8.7 NG/ML — SIGNIFICANT CHANGE UP (ref 4.1–18.4)
TSH SERPL-MCNC: 1.97 UIU/ML — SIGNIFICANT CHANGE UP (ref 0.27–4.2)

## 2019-04-01 NOTE — HISTORY OF PRESENT ILLNESS
[FreeTextEntry1] : 45yo M with hx of umbilical hernia repair (age 18) who presents as a referral for abnormal semen analysis.  He previously saw reproductive endocrinology who performed a semen analysis which showed normal parameters except high viscosity and abnormal morphology.  \par \par He reports that he and his wife are monogomous for the past 17 years,  in 2012 and have been trying to conceive for the last 5 years.  Neither partner has had a history of children.  He reports sexual intercourse with completion 3-4 times per week, unprotected.  His wife underwent gynecologic workup which was normal.  He reports no problems with libido or erections.  Nonsmoker, no illicit drug use, no etoh use.  He has 4 siblings who all have multiple children without issues in conception.  \par \par He denies any fever/chills, n/v, constipation/diarrhea, hematuria or urinary symptoms.

## 2019-04-01 NOTE — ASSESSMENT
[FreeTextEntry1] : 45yo M who presents for infertility workup after abnormal semenanalysis. \par \par - Repeat semen analysis (only has one sample)\par - Hormonal workup: FSH/LH, Testosterone (free/total), estradiol, TSH, prolactin\par - Scrotal ultrasound\par - RTC in 1 month to discuss results and options

## 2019-04-01 NOTE — PHYSICAL EXAM
[General Appearance - Well Developed] : well developed [Normal Appearance] : normal appearance [Well Groomed] : well groomed [General Appearance - In No Acute Distress] : no acute distress [Edema] : no peripheral edema [] : no respiratory distress [Respiration, Rhythm And Depth] : normal respiratory rhythm and effort [Exaggerated Use Of Accessory Muscles For Inspiration] : no accessory muscle use [Abdomen Tenderness] : non-tender [Costovertebral Angle Tenderness] : no ~M costovertebral angle tenderness [Penis Abnormality] : normal uncircumcised penis [Scrotum] : the scrotum was normal [Epididymis] : the epididymides were normal [Testes Tenderness] : no tenderness of the testes [Normal Station and Gait] : the gait and station were normal for the patient's age [Oriented To Time, Place, And Person] : oriented to person, place, and time [FreeTextEntry1] : RUE paresthesias

## 2019-04-02 ENCOUNTER — APPOINTMENT (OUTPATIENT)
Dept: ULTRASOUND IMAGING | Facility: CLINIC | Age: 44
End: 2019-04-02

## 2019-04-02 PROCEDURE — 84402 ASSAY OF FREE TESTOSTERONE: CPT

## 2019-04-02 PROCEDURE — 76870 US EXAM SCROTUM: CPT

## 2019-04-02 PROCEDURE — 84146 ASSAY OF PROLACTIN: CPT

## 2019-04-02 PROCEDURE — 83002 ASSAY OF GONADOTROPIN (LH): CPT

## 2019-04-02 PROCEDURE — 84403 ASSAY OF TOTAL TESTOSTERONE: CPT

## 2019-04-02 PROCEDURE — G0463: CPT

## 2019-04-02 PROCEDURE — 84443 ASSAY THYROID STIM HORMONE: CPT

## 2019-04-02 PROCEDURE — 83001 ASSAY OF GONADOTROPIN (FSH): CPT

## 2019-04-02 PROCEDURE — 82670 ASSAY OF TOTAL ESTRADIOL: CPT

## 2019-04-02 PROCEDURE — 76870 US EXAM SCROTUM: CPT | Mod: 26

## 2019-04-04 LAB
TESTOST FLD-SCNC: 371.2 NG/DL — SIGNIFICANT CHANGE UP (ref 264–916)
TESTOSTERONE.FREE+WB SERPL-MCNC: 5.3 PG/ML — LOW (ref 6.8–21.5)

## 2019-04-09 ENCOUNTER — APPOINTMENT (OUTPATIENT)
Dept: HUMAN REPRODUCTION | Facility: CLINIC | Age: 44
End: 2019-04-09

## 2019-05-14 ENCOUNTER — APPOINTMENT (OUTPATIENT)
Dept: HUMAN REPRODUCTION | Facility: CLINIC | Age: 44
End: 2019-05-14
Payer: SELF-PAY

## 2019-05-14 PROCEDURE — 89322 SEMEN ANAL STRICT CRITERIA: CPT

## 2019-05-20 ENCOUNTER — OUTPATIENT (OUTPATIENT)
Dept: OUTPATIENT SERVICES | Facility: HOSPITAL | Age: 44
LOS: 1 days | End: 2019-05-20
Payer: SELF-PAY

## 2019-05-20 ENCOUNTER — APPOINTMENT (OUTPATIENT)
Dept: UROLOGY | Facility: HOSPITAL | Age: 44
End: 2019-05-20

## 2019-05-20 VITALS
HEIGHT: 73 IN | HEART RATE: 84 BPM | BODY MASS INDEX: 28.49 KG/M2 | DIASTOLIC BLOOD PRESSURE: 86 MMHG | SYSTOLIC BLOOD PRESSURE: 123 MMHG | WEIGHT: 215 LBS

## 2019-05-20 DIAGNOSIS — K42.9 UMBILICAL HERNIA WITHOUT OBSTRUCTION OR GANGRENE: Chronic | ICD-10-CM

## 2019-05-20 DIAGNOSIS — R30.0 DYSURIA: ICD-10-CM

## 2019-05-20 DIAGNOSIS — N46.8 OTHER MALE INFERTILITY: ICD-10-CM

## 2019-05-20 PROCEDURE — G0463: CPT

## 2019-05-20 NOTE — PHYSICAL EXAM
[General Appearance - Well Nourished] : well nourished [General Appearance - Well Developed] : well developed [Normal Appearance] : normal appearance [Well Groomed] : well groomed [General Appearance - In No Acute Distress] : no acute distress [Abdomen Soft] : soft [Abdomen Tenderness] : non-tender [Costovertebral Angle Tenderness] : no ~M costovertebral angle tenderness [Urethral Meatus] : meatus normal [Penis Abnormality] : normal uncircumcised penis [Scrotum] : the scrotum was normal [Epididymis] : the epididymides were normal [Testes Mass (___cm)] : there were no testicular masses [Testes Tenderness] : no tenderness of the testes [Edema] : no peripheral edema [Respiration, Rhythm And Depth] : normal respiratory rhythm and effort [] : no respiratory distress [Exaggerated Use Of Accessory Muscles For Inspiration] : no accessory muscle use [Oriented To Time, Place, And Person] : oriented to person, place, and time [Mood] : the mood was normal [Affect] : the affect was normal [Not Anxious] : not anxious [Normal Station and Gait] : the gait and station were normal for the patient's age [No Focal Deficits] : no focal deficits [No Palpable Adenopathy] : no palpable adenopathy [FreeTextEntry1] : no varicoceles

## 2019-05-20 NOTE — HISTORY OF PRESENT ILLNESS
[FreeTextEntry1] : 44M with primary infertility, 29F wife with normal evaluation, never pregnant, still unable to conceive despite timed intercourse. December semen analysis only showed high viscosity, had repeat semen analysis without sufficient volume. Gets charged for each analysis so doesn't want another one. Reports adequate ejaculate volume normally and was normal last time. Not on alpha blockers or any meds at all. Reports no change in his medical history.\par \par Normal labs and normal scrotal US. No significant findings on exam - normal testicles, no varicoceles. \par \par He is interested in seeing a reproductive specialist for ART, referral made and list of providers given to patient.

## 2019-05-20 NOTE — ASSESSMENT
[FreeTextEntry1] : A/P: 44M with primary infertility, no identifiable abnormalities\par \par -- referral to reproductive specialist for ART\par -- consider repeat semen analysis

## 2019-06-13 ENCOUNTER — APPOINTMENT (OUTPATIENT)
Dept: DERMATOLOGY | Facility: HOSPITAL | Age: 44
End: 2019-06-13

## 2019-06-13 ENCOUNTER — OUTPATIENT (OUTPATIENT)
Dept: OUTPATIENT SERVICES | Facility: HOSPITAL | Age: 44
LOS: 1 days | End: 2019-06-13
Payer: SELF-PAY

## 2019-06-13 VITALS
HEART RATE: 86 BPM | WEIGHT: 215 LBS | BODY MASS INDEX: 28.49 KG/M2 | HEIGHT: 73 IN | DIASTOLIC BLOOD PRESSURE: 82 MMHG | SYSTOLIC BLOOD PRESSURE: 121 MMHG | RESPIRATION RATE: 16 BRPM

## 2019-06-13 DIAGNOSIS — L98.9 DISORDER OF THE SKIN AND SUBCUTANEOUS TISSUE, UNSPECIFIED: ICD-10-CM

## 2019-06-13 DIAGNOSIS — R20.2 PARESTHESIA OF SKIN: ICD-10-CM

## 2019-06-13 DIAGNOSIS — K42.9 UMBILICAL HERNIA WITHOUT OBSTRUCTION OR GANGRENE: Chronic | ICD-10-CM

## 2019-06-13 PROCEDURE — G0463: CPT

## 2019-07-16 ENCOUNTER — APPOINTMENT (OUTPATIENT)
Dept: NEUROLOGY | Facility: HOSPITAL | Age: 44
End: 2019-07-16

## 2019-07-16 ENCOUNTER — OUTPATIENT (OUTPATIENT)
Dept: OUTPATIENT SERVICES | Facility: HOSPITAL | Age: 44
LOS: 1 days | End: 2019-07-16
Payer: SELF-PAY

## 2019-07-16 VITALS
SYSTOLIC BLOOD PRESSURE: 137 MMHG | DIASTOLIC BLOOD PRESSURE: 91 MMHG | WEIGHT: 215 LBS | HEART RATE: 84 BPM | HEIGHT: 73 IN | BODY MASS INDEX: 28.49 KG/M2

## 2019-07-16 DIAGNOSIS — M54.12 RADICULOPATHY, CERVICAL REGION: ICD-10-CM

## 2019-07-16 DIAGNOSIS — R20.2 PARESTHESIA OF SKIN: ICD-10-CM

## 2019-07-16 DIAGNOSIS — M54.16 RADICULOPATHY, LUMBAR REGION: ICD-10-CM

## 2019-07-16 DIAGNOSIS — K42.9 UMBILICAL HERNIA WITHOUT OBSTRUCTION OR GANGRENE: Chronic | ICD-10-CM

## 2019-07-16 DIAGNOSIS — R56.9 UNSPECIFIED CONVULSIONS: ICD-10-CM

## 2019-07-16 PROCEDURE — G0463: CPT

## 2019-07-16 NOTE — DISCUSSION/SUMMARY
[FreeTextEntry1] : 43 y.o. M with PMH of anal fistulotomy p/w follow up visit after referral from medicine for\par R sided numbness and tingling. On exam no focal weakness, slightly increase sensitivity to temperature R>L. MRI Brain wnl, MRI shoulder/brachial plexus should small infraspintaus tear, MR c-spine mild findings which would not explain symptoms, EMG negative. Vit B12, B3, B1 all wnl. Chest Xray negative. received 8 weeks of PT in 2018 to no effect.\par \par Impression: cervical and likely lumbar radiculopathy\par \par Plan:\par - Vit B12 1000mcg daily\par - MRI without contrast lumbar spine due to worsening back pain\par - Tylenol and Motrin for pain control\par - Encouraged to be active\par -F/U in 2 months after seeing Rheum and PMR.

## 2019-07-16 NOTE — HISTORY OF PRESENT ILLNESS
[FreeTextEntry1] : Patient presents today for follow up. States that the gabapentin and PT has not helped him at all. Pain and tingling sensation is still present and goes down from the shoulder to the arm. Also radiates down the right back and to the right leg. 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 M with PMH of anal fistulotomy p/w initial evaluation after referral from medicine for\par R sided numbness and tingling .One month prior while he was at work he noticed a change in sensation on the R side of his body, and lost temperature sensations in his R hand, first three digits, associated with pain. Associated symptoms included headache, numbness, and tingling. He took Tylenol for this pain and it improved. Thereafter he noticed numbness and tingling in the right side of his body including his arm, side and leg up till his ankle that continued and described as the right side is "falling asleep". Patient visit urgent care where he was prescribed ibuprofen. He no longer has any pain in his neck or head but the tingling and numbness has persisted in the right side of his body. He denies any trouble walking, visual disturbances, bowel/bladder incontinence, dizziness, or fevers. He denies any recent travel, accidents, trauma. He denies any FH of neurological disease. He had an MRI Brain w/o contrast completed with no acute findings. In addition s/p CBc, CMP lipid profile Hba1c, B12/folate within normal limits.

## 2019-07-16 NOTE — PHYSICAL EXAM
[FreeTextEntry1] : Alert and oriented. Not in acute distress\par Power 4/4 b/l UE and LE\par Tone wnl B/L UE and LE\par Reflexes 2+ B/L biceps, triceps, knee, ankle\par Sensory: decreased perception to cold on right UE; decreased pain perception Rt UE\par Pain reproduced by stretching the neck.\par normal vibration sensation b/l\par SLRT +\par

## 2019-07-24 ENCOUNTER — OUTPATIENT (OUTPATIENT)
Dept: OUTPATIENT SERVICES | Facility: HOSPITAL | Age: 44
LOS: 1 days | End: 2019-07-24

## 2019-07-24 ENCOUNTER — APPOINTMENT (OUTPATIENT)
Dept: MRI IMAGING | Facility: CLINIC | Age: 44
End: 2019-07-24
Payer: COMMERCIAL

## 2019-07-24 DIAGNOSIS — M54.12 RADICULOPATHY, CERVICAL REGION: ICD-10-CM

## 2019-07-24 DIAGNOSIS — M54.16 RADICULOPATHY, LUMBAR REGION: ICD-10-CM

## 2019-07-24 DIAGNOSIS — R20.2 PARESTHESIA OF SKIN: ICD-10-CM

## 2019-07-24 DIAGNOSIS — K42.9 UMBILICAL HERNIA WITHOUT OBSTRUCTION OR GANGRENE: Chronic | ICD-10-CM

## 2019-07-24 PROCEDURE — 72148 MRI LUMBAR SPINE W/O DYE: CPT | Mod: 26

## 2019-09-10 ENCOUNTER — APPOINTMENT (OUTPATIENT)
Dept: NEUROLOGY | Facility: HOSPITAL | Age: 44
End: 2019-09-10

## 2019-09-10 ENCOUNTER — OUTPATIENT (OUTPATIENT)
Dept: OUTPATIENT SERVICES | Facility: HOSPITAL | Age: 44
LOS: 1 days | End: 2019-09-10
Payer: SELF-PAY

## 2019-09-10 VITALS
HEART RATE: 74 BPM | BODY MASS INDEX: 27.43 KG/M2 | RESPIRATION RATE: 14 BRPM | DIASTOLIC BLOOD PRESSURE: 91 MMHG | WEIGHT: 207 LBS | SYSTOLIC BLOOD PRESSURE: 133 MMHG | HEIGHT: 73 IN

## 2019-09-10 DIAGNOSIS — R56.9 UNSPECIFIED CONVULSIONS: ICD-10-CM

## 2019-09-10 DIAGNOSIS — K42.9 UMBILICAL HERNIA WITHOUT OBSTRUCTION OR GANGRENE: Chronic | ICD-10-CM

## 2019-09-10 DIAGNOSIS — R20.2 PARESTHESIA OF SKIN: ICD-10-CM

## 2019-09-10 PROCEDURE — G0463: CPT

## 2019-09-10 NOTE — DISCUSSION/SUMMARY
[FreeTextEntry1] : 43 y.o. M with PMH of anal fistulotomy p/w follow up visitfor right arm and upper thigh patchy tingling sensation (no numbness). On exam no focal weakness, slightly increase sensitivity to temperature R>L (arm and upper ledial thigh). MRI Brain wnl, MRI shoulder/brachial plexus should small infraspintaus tear, MR c-spine and L spine woth mild findings which would not explain symptoms, EMG negative. Vit B12, B3, B1 wnl. Chest Xray negative. received 8 weeks of PT in 2018 to no effect. Was on gabapentin for years due to symptoms but recently to a medication vacation and there was no change in symptoms so pt stopped completely. Pt is a  and is exposed to heat on a daily basis for 20 years on the right side, unclear if there is any correlation.\par \par Right sided paresthesias that do not cause dysfunction. MRI of the neuroaxis show not structural etiology, EMG WNL. Serum studies wnl. \par \par Plan:\par - c/w Vit B12 1000mcg daily\par - Tylenol and Motrin for pain control\par - Encouraged to be active\par - FU with PCP for continued monitoring and routine blood work. \par - FU as needed \par

## 2019-09-10 NOTE — HISTORY OF PRESENT ILLNESS
[FreeTextEntry1] : Patient presents today for follow up for right sided paresthesias. MRI L spine done for back pain shows small central disc herniation without nerve root displacement at L3-L4. Minimal disc bulges at L4-L5 and L5-S1. Gabapentin and PT has not helped him at all. Pain and tingling sensation is still present and goes down from the shoulder to the arm. Also radiates down the right back and torso and to the right upper medial leg. Works as a  and job involves lifting heavy weights.\par \par Prior Hx: 42 y.o. M with PMH of anal fistulotomy p/w initial evaluation after referral from medicine for\par R sided numbness and tingling .One month prior while he was at work he noticed a change in sensation on the R side of his body, and lost temperature sensations in his R hand, first three digits, associated with pain. Associated symptoms included headache, numbness, and tingling. He took Tylenol for this pain and it improved. Thereafter he noticed numbness and tingling in the right side of his body including his arm, side and leg up till his ankle that continued and described as the right side is "falling asleep". Patient visit urgent care where he was prescribed ibuprofen. He no longer has any pain in his neck or head but the tingling and numbness has persisted in the right side of his body. He denies any trouble walking, visual disturbances, bowel/bladder incontinence, dizziness, or fevers. He denies any recent travel, accidents, trauma. He denies any FH of neurological disease. He had an MRI Brain w/o contrast completed with no acute findings. In addition s/p CBc, CMP lipid profile Hba1c, B12/folate within normal limits. \par  \par MRI L spine reviewed and shows small central disc herniation without nerve root displacement at L3-L4. Minimal disc bulges at L4-L5 and L5-S1. Patient to follow-up as previously scheduled.

## 2019-09-10 NOTE — PHYSICAL EXAM
[FreeTextEntry1] : Alert and oriented. Not in acute distress\par Power 5/5 b/l UE and LE\par Tone wnl B/L UE and LE\par Reflexes 2+ B/L biceps, triceps, knee, ankle\par Sensory: decreased perception to cold on right UE; decreased pain perception Rt UE\par Pain reproduced by stretching the neck.\par normal vibration sensation b/l\par SLRT +

## 2021-09-22 ENCOUNTER — APPOINTMENT (OUTPATIENT)
Dept: INTERNAL MEDICINE | Facility: CLINIC | Age: 46
End: 2021-09-22
Payer: COMMERCIAL

## 2021-09-22 VITALS — DIASTOLIC BLOOD PRESSURE: 70 MMHG | SYSTOLIC BLOOD PRESSURE: 110 MMHG

## 2021-09-22 VITALS
BODY MASS INDEX: 28.49 KG/M2 | WEIGHT: 215 LBS | HEIGHT: 73 IN | SYSTOLIC BLOOD PRESSURE: 120 MMHG | DIASTOLIC BLOOD PRESSURE: 90 MMHG | HEART RATE: 100 BPM | OXYGEN SATURATION: 99 %

## 2021-09-22 DIAGNOSIS — R20.2 PARESTHESIA OF SKIN: ICD-10-CM

## 2021-09-22 DIAGNOSIS — Z87.898 PERSONAL HISTORY OF OTHER SPECIFIED CONDITIONS: ICD-10-CM

## 2021-09-22 DIAGNOSIS — K61.0 ANAL ABSCESS: ICD-10-CM

## 2021-09-22 DIAGNOSIS — Z80.0 FAMILY HISTORY OF MALIGNANT NEOPLASM OF DIGESTIVE ORGANS: ICD-10-CM

## 2021-09-22 DIAGNOSIS — M54.9 DORSALGIA, UNSPECIFIED: ICD-10-CM

## 2021-09-22 DIAGNOSIS — K62.89 OTHER SPECIFIED DISEASES OF ANUS AND RECTUM: ICD-10-CM

## 2021-09-22 DIAGNOSIS — Z80.3 FAMILY HISTORY OF MALIGNANT NEOPLASM OF BREAST: ICD-10-CM

## 2021-09-22 PROCEDURE — 99386 PREV VISIT NEW AGE 40-64: CPT | Mod: 25

## 2021-09-22 PROCEDURE — 90686 IIV4 VACC NO PRSV 0.5 ML IM: CPT

## 2021-09-22 PROCEDURE — G0442 ANNUAL ALCOHOL SCREEN 15 MIN: CPT

## 2021-09-22 PROCEDURE — G0444 DEPRESSION SCREEN ANNUAL: CPT | Mod: 59

## 2021-09-22 PROCEDURE — G0008: CPT

## 2021-09-22 NOTE — ASSESSMENT
[FreeTextEntry1] : 46M with prediabetes and overweight BMI, prev work up for right sided pain and paresthesia without ortho or neurologic cause found presents for visit to reestablish care in clinic\par \par 1. HCM\par -colon cancer screening: fit test and GI referral provided today\par -HIV screening offered today\par -tdap due next year 2022\par -flu vaccine - given today\par -covid vaccine - [employer] completed. \par \par 2. Overweight BMI and predm\par -repeat a1c today\par -During today's visit we discussed that to control cholesterol and blood sugars as well as maintain healthy blood pressure it is recommended to eat plenty of fruits and vegetables, drink adequate water and exercise regularly. We discussed decreasing saturated fats (found in red meat, dairy products like beef, pork, cheese, butter etc) and decreasing meals high in sugar or simple carbohydrates. Plate at meal time should have 50% of the surface covered in vegetables, 25% a lean meat like chicken or fish and 25% a whole grain or healthy carbohydrate. Advised to avoid sodas, juices, alcohol as well.\par \par 3. Acute back pain\par -no trouble urinating, trouble ambulating\par -acute musculoskeletal pain - ibuprofen prn and trial cyclobenzaprine 5 mg qhs do not mix with alcohol or other sedating medications, no driving/cooking when using\par \par

## 2021-09-22 NOTE — HISTORY OF PRESENT ILLNESS
[FreeTextEntry1] : Patient presents today for annual physical exam\par  [de-identified] : 46M previously a patient of the resident clinic last seen by Dr. Joseph 11/23/18 presents for visit to reestablish care. \par \par Reviewed notes:\par Dr. Sharp 12/5/18 - thoacic outlet vs cyst - mri shoulder\par Dr. Powell 12/18/18 - no brachial plexus pathology, partial thickness cuff tear. no ortho pathology for rue pain.\par Dr. Chapa - 1/22/19 - gabapentin and PT not helping. f/u with pmr and rheum. stop gabapentin\par Dr. Márquez 5/1/19 - vitamin deficiencies\par Dr. 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 Castrejon - 6/13/19 - rec bx of numbness site?\par Dr. Chilel - 7/16/19 - b12 suplementation\par Dr. Nissenbaum 7/30/19 - disc herniation\par Dr. Vlila 9/10/19 - heat exposure to affected side from [employer] job maybe that explains\par \par Pt reporting back pain in the pandemic that goes away with heat therapy. Difficulty picking up water bottle from the floor and cannot get up. Went to pharmacy and using heating pack. Using two heating pads today. Took tylenol for it. Running for exercise. Bicycle riding. Less in pandemic. No trouble urinating or walking. Just trouble sleeping.

## 2021-09-22 NOTE — HEALTH RISK ASSESSMENT
[Good] : ~his/her~  mood as  good [Yes] : Yes [2 - 3 times a week (3 pts)] : 2 - 3  times a week (3 points) [1 or 2 (0 pts)] : 1 or 2 (0 points) [Never (0 pts)] : Never (0 points) [No] : In the past 12 months have you used drugs other than those required for medical reasons? No [0] : 2) Feeling down, depressed, or hopeless: Not at all (0) [PHQ-2 Negative - No further assessment needed] : PHQ-2 Negative - No further assessment needed [None] : None [With Family] : lives with family [Employed] : employed [] :  [Sexually Active] : sexually active [Feels Safe at Home] : Feels safe at home [Fully functional (bathing, dressing, toileting, transferring, walking, feeding)] : Fully functional (bathing, dressing, toileting, transferring, walking, feeding) [Fully functional (using the telephone, shopping, preparing meals, housekeeping, doing laundry, using] : Fully functional and needs no help or supervision to perform IADLs (using the telephone, shopping, preparing meals, housekeeping, doing laundry, using transportation, managing medications and managing finances) [Smoke Detector] : smoke detector [Carbon Monoxide Detector] : carbon monoxide detector [Seat Belt] :  uses seat belt [Sunscreen] : uses sunscreen [HIV Test offered] : HIV Test offered [Hepatitis C test offered] : Hepatitis C test offered [] : No [Audit-CScore] : 3 [de-identified] : In 2009 - cocaine use. had nose bleed and stopped.  [de-identified] : running, biking [de-identified] : lot of pizza, pasta, chicken. vegetables - not as much. lot of rice. fried chicken.  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 reading glasses number increased

## 2021-09-22 NOTE — PHYSICAL EXAM
[No Acute Distress] : no acute distress [Well Nourished] : well nourished [Well Developed] : well developed [Well-Appearing] : well-appearing [Normal Sclera/Conjunctiva] : normal sclera/conjunctiva [PERRL] : pupils equal round and reactive to light [EOMI] : extraocular movements intact [Normal Outer Ear/Nose] : the outer ears and nose were normal in appearance [Normal Oropharynx] : the oropharynx was normal [Normal TMs] : both tympanic membranes were normal [No JVD] : no jugular venous distention [No Lymphadenopathy] : no lymphadenopathy [Supple] : supple [Thyroid Normal, No Nodules] : the thyroid was normal and there were no nodules present [No Respiratory Distress] : no respiratory distress  [No Accessory Muscle Use] : no accessory muscle use [Clear to Auscultation] : lungs were clear to auscultation bilaterally [Normal Rate] : normal rate  [Regular Rhythm] : with a regular rhythm [Normal S1, S2] : normal S1 and S2 [No Murmur] : no murmur heard [No Carotid Bruits] : no carotid bruits [No Varicosities] : no varicosities [No Edema] : there was no peripheral edema [No Palpable Aorta] : no palpable aorta [No Extremity Clubbing/Cyanosis] : no extremity clubbing/cyanosis [Soft] : abdomen soft [Non Tender] : non-tender [Non-distended] : non-distended [No Masses] : no abdominal mass palpated [No HSM] : no HSM [Normal Posterior Cervical Nodes] : no posterior cervical lymphadenopathy [Normal Anterior Cervical Nodes] : no anterior cervical lymphadenopathy [No CVA Tenderness] : no CVA  tenderness [No Spinal Tenderness] : no spinal tenderness [No Joint Swelling] : no joint swelling [Grossly Normal Strength/Tone] : grossly normal strength/tone [No Rash] : no rash [Coordination Grossly Intact] : coordination grossly intact [No Focal Deficits] : no focal deficits [Normal Gait] : normal gait [Normal Affect] : the affect was normal [Normal Insight/Judgement] : insight and judgment were intact [de-identified] : no midline tenderness, no masses palpated

## 2021-09-23 LAB
ALBUMIN SERPL ELPH-MCNC: 4.8 G/DL
ALP BLD-CCNC: 90 U/L
ALT SERPL-CCNC: 51 U/L
ANION GAP SERPL CALC-SCNC: 16 MMOL/L
AST SERPL-CCNC: 43 U/L
BASOPHILS # BLD AUTO: 0.03 K/UL
BASOPHILS NFR BLD AUTO: 0.6 %
BILIRUB SERPL-MCNC: 0.6 MG/DL
BUN SERPL-MCNC: 16 MG/DL
CALCIUM SERPL-MCNC: 9.4 MG/DL
CHLORIDE SERPL-SCNC: 100 MMOL/L
CHOLEST SERPL-MCNC: 229 MG/DL
CO2 SERPL-SCNC: 22 MMOL/L
CREAT SERPL-MCNC: 0.76 MG/DL
EOSINOPHIL # BLD AUTO: 0.09 K/UL
EOSINOPHIL NFR BLD AUTO: 1.8 %
ESTIMATED AVERAGE GLUCOSE: 154 MG/DL
GLUCOSE SERPL-MCNC: 95 MG/DL
HBA1C MFR BLD HPLC: 7 %
HCT VFR BLD CALC: 47.5 %
HCV AB SER QL: NONREACTIVE
HCV S/CO RATIO: 0.17 S/CO
HDLC SERPL-MCNC: 31 MG/DL
HGB BLD-MCNC: 15.6 G/DL
HIV1+2 AB SPEC QL IA.RAPID: NONREACTIVE
IMM GRANULOCYTES NFR BLD AUTO: 0.2 %
LDLC SERPL CALC-MCNC: NORMAL MG/DL
LYMPHOCYTES # BLD AUTO: 1.5 K/UL
LYMPHOCYTES NFR BLD AUTO: 29.8 %
MAN DIFF?: NORMAL
MCHC RBC-ENTMCNC: 28.9 PG
MCHC RBC-ENTMCNC: 32.8 GM/DL
MCV RBC AUTO: 88 FL
MONOCYTES # BLD AUTO: 0.44 K/UL
MONOCYTES NFR BLD AUTO: 8.7 %
NEUTROPHILS # BLD AUTO: 2.96 K/UL
NEUTROPHILS NFR BLD AUTO: 58.9 %
NONHDLC SERPL-MCNC: 198 MG/DL
PLATELET # BLD AUTO: 260 K/UL
POTASSIUM SERPL-SCNC: 4.2 MMOL/L
PROT SERPL-MCNC: 6.7 G/DL
RBC # BLD: 5.4 M/UL
RBC # FLD: 12 %
SODIUM SERPL-SCNC: 138 MMOL/L
TRIGL SERPL-MCNC: 447 MG/DL
WBC # FLD AUTO: 5.03 K/UL

## 2021-09-28 LAB — HEMOCCULT STL QL IA: NEGATIVE

## 2021-10-07 ENCOUNTER — APPOINTMENT (OUTPATIENT)
Dept: GASTROENTEROLOGY | Facility: CLINIC | Age: 46
End: 2021-10-07
Payer: COMMERCIAL

## 2021-10-07 VITALS
DIASTOLIC BLOOD PRESSURE: 80 MMHG | HEIGHT: 73 IN | WEIGHT: 215 LBS | BODY MASS INDEX: 28.49 KG/M2 | RESPIRATION RATE: 14 BRPM | OXYGEN SATURATION: 98 % | SYSTOLIC BLOOD PRESSURE: 125 MMHG | TEMPERATURE: 98 F | HEART RATE: 72 BPM

## 2021-10-07 DIAGNOSIS — K92.1 MELENA: ICD-10-CM

## 2021-10-07 PROCEDURE — 99203 OFFICE O/P NEW LOW 30 MIN: CPT

## 2021-10-07 NOTE — PHYSICAL EXAM
[General Appearance - Alert] : alert [General Appearance - In No Acute Distress] : in no acute distress [Sclera] : the sclera and conjunctiva were normal [PERRL With Normal Accommodation] : pupils were equal in size, round, and reactive to light [Neck Appearance] : the appearance of the neck was normal [] : no respiratory distress [Respiration, Rhythm And Depth] : normal respiratory rhythm and effort [Heart Rate And Rhythm] : heart rate was normal and rhythm regular [Heart Sounds] : normal S1 and S2 [Bowel Sounds] : normal bowel sounds [Abdomen Soft] : soft [Normal Sphincter Tone] : normal sphincter tone [No Rectal Mass] : no rectal mass [External Hemorrhoid] : external hemorrhoids [FreeTextEntry1] : Brown stool, no blood

## 2021-10-07 NOTE — ASSESSMENT
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 Rare and intermittent rectal bleeding in setting of constipation, otherwise no abd pain, n/v, weight loss. No prior egd/colonoscopy. No fhx of GI malignancy or IBD. Abdominal exam benign, rectal exam showing ext hermorrhoids, but no blood or obvious masses. \par \par # Hematochezia \par # Constipation \par # Perianal abscess + trans-sphincteric fistula s/p I+D, seton palcement/removal, fistulotomy (2014). \par \par - Will schedule colonoscopy for intraluminal evaluation. R/B discussed. No cardiopulmonary dz, no a/c. \par - hematochezia likely secondary to hemorrhoids as it occurs in the setting of constipation.  Will start as needed MiraLAX\par -Unclear etiology of prior perianal abscess and fistula, depending on colonoscopy findings may require further workup to r/o underlying IBD although low suspicion as patient is largely asymptomatic. \par - RTC after colonoscopy

## 2021-12-06 ENCOUNTER — APPOINTMENT (OUTPATIENT)
Dept: INTERNAL MEDICINE | Facility: CLINIC | Age: 46
End: 2021-12-06
Payer: COMMERCIAL

## 2021-12-06 VITALS
BODY MASS INDEX: 23.6 KG/M2 | DIASTOLIC BLOOD PRESSURE: 80 MMHG | HEART RATE: 75 BPM | WEIGHT: 204 LBS | SYSTOLIC BLOOD PRESSURE: 130 MMHG | OXYGEN SATURATION: 98 % | RESPIRATION RATE: 14 BRPM | HEIGHT: 78 IN

## 2021-12-06 PROCEDURE — 83036 HEMOGLOBIN GLYCOSYLATED A1C: CPT | Mod: QW

## 2021-12-06 PROCEDURE — G0009: CPT

## 2021-12-06 PROCEDURE — 99214 OFFICE O/P EST MOD 30 MIN: CPT | Mod: 25

## 2021-12-06 PROCEDURE — 90732 PPSV23 VACC 2 YRS+ SUBQ/IM: CPT

## 2021-12-06 NOTE — HISTORY OF PRESENT ILLNESS
[FreeTextEntry1] : 46M with overweight BMI, presents for follow up of type 2 diabetes. He was previously in prediabetes range however during pandemic gained some weight/changed eating habits and last a1c was 7.0% [de-identified] : -poct a1c/fs\par -foot exam\par -eye exam - 4 months ago. \par -tdap - had last year\par \par thinking of getting booster - wants to protect 3 yo daughter. \par last year went to immigration and had vaccines - thinks he had TDAP then - July

## 2021-12-06 NOTE — PHYSICAL EXAM
[Normal] : no acute distress, well nourished, well developed and well-appearing [No Respiratory Distress] : no respiratory distress  [Coordination Grossly Intact] : coordination grossly intact [Comprehensive Foot Exam Normal] : Right and left foot were examined and both feet are normal. No ulcers in either foot. Toes are normal and with full ROM.  Normal tactile sensation with monofilament testing throughout both feet

## 2021-12-06 NOTE — ASSESSMENT
[FreeTextEntry1] : 46M with overweight BMI, presents for follow up of type 2 diabetes. He was previously in prediabetes range however during pandemic gained some weight/changed eating habits and last a1c was 7.0%\par \par 1. T2DM\par -Last A1c 9/2021 was 7.0%\par -repeat POCT A1c today\par -foot exam - normal 12/2021\par -eye exam - 08/2021\par -statin - repeat lipid profile today\par -urine microalbumin/cr ratio - check 12/2021. \par -pneumovax - 12/2021\par -wants to repeat in 3m before starting medication. \par \par 2. HLD\par -LDL not able to be measured - check today\par \par 3. HCM\par -colon cancer screening: FIT test negative 09/2021\par -HIV screening negative 09/2021\par -tdap - 07/2021\par -flu vaccine - 09/2021\par -covid vaccine - [employer] completed. going to have booster\par -pneumovax 12/6/2021

## 2021-12-07 LAB
CHOLEST SERPL-MCNC: 217 MG/DL
CREAT SPEC-SCNC: 138 MG/DL
HDLC SERPL-MCNC: 41 MG/DL
LDLC SERPL CALC-MCNC: 151 MG/DL
MICROALBUMIN 24H UR DL<=1MG/L-MCNC: <1.2 MG/DL
MICROALBUMIN/CREAT 24H UR-RTO: NORMAL MG/G
NONHDLC SERPL-MCNC: 176 MG/DL
TRIGL SERPL-MCNC: 127 MG/DL

## 2021-12-07 RX ORDER — ACETAMINOPHEN 500 MG/1
500 TABLET ORAL
Refills: 0 | Status: COMPLETED | COMMUNITY
Start: 2019-01-22 | End: 2021-12-07

## 2021-12-07 RX ORDER — MECOBALAMIN 1000 MCG
1 TABLET,CHEWABLE ORAL DAILY
Qty: 30 | Refills: 4 | Status: COMPLETED | COMMUNITY
Start: 2019-07-16 | End: 2021-12-07

## 2021-12-07 RX ORDER — RIBOFLAVIN (VITAMIN B2) 100 MG
100 TABLET ORAL DAILY
Qty: 90 | Refills: 1 | Status: COMPLETED | COMMUNITY
Start: 2019-03-17 | End: 2021-12-07

## 2021-12-07 RX ORDER — CYCLOBENZAPRINE HYDROCHLORIDE 5 MG/1
5 TABLET, FILM COATED ORAL
Qty: 5 | Refills: 0 | Status: COMPLETED | COMMUNITY
Start: 2021-09-22 | End: 2021-12-07

## 2022-01-19 DIAGNOSIS — Z12.11 ENCOUNTER FOR SCREENING FOR MALIGNANT NEOPLASM OF COLON: ICD-10-CM

## 2022-01-28 ENCOUNTER — OUTPATIENT (OUTPATIENT)
Dept: OUTPATIENT SERVICES | Facility: HOSPITAL | Age: 47
LOS: 1 days | End: 2022-01-28
Payer: COMMERCIAL

## 2022-01-28 ENCOUNTER — RESULT REVIEW (OUTPATIENT)
Age: 47
End: 2022-01-28

## 2022-01-28 ENCOUNTER — APPOINTMENT (OUTPATIENT)
Dept: GASTROENTEROLOGY | Facility: HOSPITAL | Age: 47
End: 2022-01-28

## 2022-01-28 VITALS
OXYGEN SATURATION: 97 % | SYSTOLIC BLOOD PRESSURE: 118 MMHG | HEIGHT: 73 IN | DIASTOLIC BLOOD PRESSURE: 80 MMHG | TEMPERATURE: 97 F | HEART RATE: 77 BPM | WEIGHT: 214.95 LBS | RESPIRATION RATE: 13 BRPM

## 2022-01-28 VITALS
OXYGEN SATURATION: 95 % | DIASTOLIC BLOOD PRESSURE: 79 MMHG | SYSTOLIC BLOOD PRESSURE: 110 MMHG | RESPIRATION RATE: 15 BRPM | HEART RATE: 64 BPM

## 2022-01-28 DIAGNOSIS — K60.3 ANAL FISTULA: ICD-10-CM

## 2022-01-28 DIAGNOSIS — K92.1 MELENA: ICD-10-CM

## 2022-01-28 DIAGNOSIS — K42.9 UMBILICAL HERNIA WITHOUT OBSTRUCTION OR GANGRENE: Chronic | ICD-10-CM

## 2022-01-28 DIAGNOSIS — K59.00 CONSTIPATION, UNSPECIFIED: ICD-10-CM

## 2022-01-28 PROCEDURE — 45380 COLONOSCOPY AND BIOPSY: CPT

## 2022-01-28 PROCEDURE — 88305 TISSUE EXAM BY PATHOLOGIST: CPT | Mod: 26

## 2022-01-28 PROCEDURE — 88305 TISSUE EXAM BY PATHOLOGIST: CPT

## 2022-01-28 PROCEDURE — 45380 COLONOSCOPY AND BIOPSY: CPT | Mod: PT

## 2022-01-28 NOTE — ASU PATIENT PROFILE, ADULT - FALL HARM RISK - UNIVERSAL INTERVENTIONS
Bed in lowest position, wheels locked, appropriate side rails in place/Call bell, personal items and telephone in reach/Instruct patient to call for assistance before getting out of bed or chair/Non-slip footwear when patient is out of bed/[place] to call system/Physically safe environment - no spills, clutter or unnecessary equipment/Purposeful Proactive Rounding/Room/bathroom lighting operational, light cord in reach

## 2022-01-28 NOTE — ASU DISCHARGE PLAN (ADULT/PEDIATRIC) - PROCEDURE
S: Feeling well. No significant complaints or concerns. Reports consistent, daily fetal mvmt. Denies ctxs, LOF, or vaginal bldng. Denies travel to [facility] affected area. O: See prenatal flowsheet for maternal and fetal exam  Blood pressure 120/72, height 5' 4\" (1.626 m), weight 178 lb 6.4 oz (80.9 kg), last menstrual period 2018.     A:  39w4d   Size=Dates    P: Discussed postdates management  See prenatal checklist for other topics covered during today's visit  RTO in 1 weeks for MAURY with CHRISTINE Colonoscopy
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
## 2022-01-28 NOTE — ASU DISCHARGE PLAN (ADULT/PEDIATRIC) - NS MD DC FALL RISK RISK
For information on Fall & Injury Prevention, visit: [email or web address] OR  [email or web address] OR  https://www.cdc.gov/steadi/patient.html

## 2022-02-09 ENCOUNTER — APPOINTMENT (OUTPATIENT)
Dept: GASTROENTEROLOGY | Facility: CLINIC | Age: 47
End: 2022-02-09
Payer: COMMERCIAL

## 2022-02-09 VITALS
SYSTOLIC BLOOD PRESSURE: 135 MMHG | WEIGHT: 210 LBS | HEART RATE: 75 BPM | TEMPERATURE: 97 F | DIASTOLIC BLOOD PRESSURE: 85 MMHG | BODY MASS INDEX: 27.83 KG/M2 | HEIGHT: 73 IN | OXYGEN SATURATION: 98 %

## 2022-02-09 PROCEDURE — 99204 OFFICE O/P NEW MOD 45 MIN: CPT

## 2022-02-09 RX ORDER — POLYETHYLENE GLYCOL 3350 AND ELECTROLYTES WITH LEMON FLAVOR 236; 22.74; 6.74; 5.86; 2.97 G/4L; G/4L; G/4L; G/4L; G/4L
236 POWDER, FOR SOLUTION ORAL
Qty: 1 | Refills: 0 | Status: DISCONTINUED | COMMUNITY
Start: 2021-10-07 | End: 2022-02-09

## 2022-02-09 RX ORDER — POLYETHYLENE GLYCOL 3350 AND ELECTROLYTES WITH LEMON FLAVOR 236; 22.74; 6.74; 5.86; 2.97 G/4L; G/4L; G/4L; G/4L; G/4L
236 POWDER, FOR SOLUTION ORAL
Qty: 1 | Refills: 0 | Status: DISCONTINUED | COMMUNITY
Start: 2022-01-19 | End: 2022-02-09

## 2022-02-09 NOTE — ASSESSMENT
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 Rare and intermittent rectal bleeding in setting of constipation, otherwise no abd pain, n/v, weight loss. No prior egd/colonoscopy. No fhx of GI malignancy or IBD. \par \par - s/p colonoscopy Jan 2022 - reviewed w patient -- normal ileum, +diverticulosis, +internal hemorrhoids. \par - Reviewed natural hx of diverticulosis with patient. \par - discussed high fiber diet \par - intermittent hematochezia likely 2/2 internal hemorrhoids in setting of ongoing constipation. Recommend starting miralax daily. \par -Unclear etiology of prior perianal abscess and fistula, no colonic or ileal findings to suggest IBD. Patient feels well w exception of constipation as above. No fhx of IBD. Will check CRP and fecal calprotectin and continue to monitor symptoms. \par \par - RTC in 3-4 mos.

## 2022-02-09 NOTE — HISTORY OF PRESENT ILLNESS
[de-identified] :  # [id number]\par Here today for follow up visit. \par s/p colonoscopy, significant for diverticulosis and internal hemorrhoids. \par States he continues to have constipation w/ intermittent small volume hematochezia. \par Did not start miralax. \par Denies abd pain, n/v, fevers, chills.

## 2022-03-07 ENCOUNTER — APPOINTMENT (OUTPATIENT)
Dept: INTERNAL MEDICINE | Facility: CLINIC | Age: 47
End: 2022-03-07
Payer: COMMERCIAL

## 2022-03-07 VITALS
BODY MASS INDEX: 27.83 KG/M2 | SYSTOLIC BLOOD PRESSURE: 120 MMHG | WEIGHT: 210 LBS | DIASTOLIC BLOOD PRESSURE: 80 MMHG | HEART RATE: 87 BPM | OXYGEN SATURATION: 98 % | HEIGHT: 73 IN

## 2022-03-07 LAB — HBA1C MFR BLD HPLC: 6.7

## 2022-03-07 PROCEDURE — 83036 HEMOGLOBIN GLYCOSYLATED A1C: CPT | Mod: QW

## 2022-03-07 PROCEDURE — 99214 OFFICE O/P EST MOD 30 MIN: CPT | Mod: 25

## 2022-03-07 RX ORDER — POLYETHYLENE GLYCOL 3350 17 G/17G
17 POWDER, FOR SOLUTION ORAL DAILY
Qty: 1 | Refills: 3 | Status: COMPLETED | COMMUNITY
Start: 2021-10-07 | End: 2022-03-07

## 2022-03-08 ENCOUNTER — NON-APPOINTMENT (OUTPATIENT)
Age: 47
End: 2022-03-08

## 2022-03-08 LAB
CALPROTECTIN FECAL: 29 UG/G
CHOLEST SERPL-MCNC: 228 MG/DL
CRP SERPL-MCNC: <3 MG/L
HDLC SERPL-MCNC: 38 MG/DL
LDLC SERPL CALC-MCNC: 150 MG/DL
NONHDLC SERPL-MCNC: 190 MG/DL
TRIGL SERPL-MCNC: 199 MG/DL

## 2022-03-08 NOTE — HISTORY OF PRESENT ILLNESS
[FreeTextEntry1] : t2dm [de-identified] : 46M with overweight BMI, T2DM (7.0% to 6.6% 12/2021) presents for follow up of type 2 diabetes.\par \par Bump right forearm - not painful or enlarging.

## 2022-03-08 NOTE — PHYSICAL EXAM
[Normal] : no acute distress, well nourished, well developed and well-appearing [No Respiratory Distress] : no respiratory distress  [de-identified] : Right forearm either cyst vs lipoma - non tender about 2cm circular mass. soft and not red or warm to touch.

## 2022-03-08 NOTE — ASSESSMENT
[FreeTextEntry1] : 46M with overweight BMI, T2DM (7.0% to 6.6% 12/2021) presents for follow up of type 2 diabetes.\par \par 1. T2DM\par -Last A1c 12/2021 6.6%. 3/7/22: 6.7%. \par -foot exam - normal 12/2021\par -eye exam - 08/2021\par -statin - would benefit from statin but wanted to wait. \par -urine microalbumin/cr ratio - 12/2021 neg. \par -pneumovax - 12/2021\par \par 2. HLD\par -LDL 150s - needs statin. Repeating lipid profile today.\par addendum 3/8 added rosuvastatin 5 mg daily\par repeat again next visit in 3m\par \par 3. R arm lipoma vs cyst\par -not bothersome so continue to monitor\par -can send to surgery if bothersome \par \par 4. HCM\par -colon cancer screening: FIT test negative 09/2021. colonoscopy 01/28/2022 - diverticulosis and internal hemorrhoids.Has GI f/u 05/11/22. \par -HIV screening negative 09/2021\par -tdap - 07/2021\par -flu vaccine - 09/2021\par -covid vaccine - [employer] completed.booster done 12/27/21\par -pneumovax 12/6/2021. \par

## 2022-06-20 ENCOUNTER — APPOINTMENT (OUTPATIENT)
Dept: INTERNAL MEDICINE | Facility: CLINIC | Age: 47
End: 2022-06-20
Payer: COMMERCIAL

## 2022-06-20 VITALS — BODY MASS INDEX: 26.12 KG/M2 | WEIGHT: 198 LBS | DIASTOLIC BLOOD PRESSURE: 80 MMHG | SYSTOLIC BLOOD PRESSURE: 120 MMHG

## 2022-06-20 DIAGNOSIS — S29.012A STRAIN OF MUSCLE AND TENDON OF BACK WALL OF THORAX, INITIAL ENCOUNTER: ICD-10-CM

## 2022-06-20 LAB
CHOLEST SERPL-MCNC: 176 MG/DL
HBA1C MFR BLD HPLC: 6.1
HDLC SERPL-MCNC: 39 MG/DL
LDLC SERPL CALC-MCNC: 119 MG/DL
NONHDLC SERPL-MCNC: 137 MG/DL
TRIGL SERPL-MCNC: 88 MG/DL

## 2022-06-20 PROCEDURE — 99214 OFFICE O/P EST MOD 30 MIN: CPT | Mod: 25

## 2022-06-20 PROCEDURE — 83036 HEMOGLOBIN GLYCOSYLATED A1C: CPT | Mod: QW

## 2022-06-20 NOTE — PHYSICAL EXAM
[No Respiratory Distress] : no respiratory distress  [Coordination Grossly Intact] : coordination grossly intact [Normal] : affect was normal and insight and judgment were intact [Supple] : supple [de-identified] : no midline cervical tenderness, normal ROM [de-identified] : RUE 2cm or so cyst vs lipoma. also noted similar mass in left side abdomen - not tender, not red, no fluctuance.

## 2022-06-20 NOTE — HISTORY OF PRESENT ILLNESS
[FreeTextEntry1] : f/u of t2dm [de-identified] : 47M with overweight BMI, T2DM (7.0% to 6.6% 12/2021) presents for follow up of type 2 diabetes.\par \par Reporting bilateral neck stiffness, working in [employer]. Walking for exercise. His wife does massage that helps. He didn't take anything for it. He took tylenol when he had the flu earlier. \par \par Reports eating healthy, more vegetables. Eating pasta every day. \par \par RUE lipoma stable, has one in left midaxillary line close to hip that is not bothersome. Not interested in seeing surgery for it right now.

## 2022-06-20 NOTE — ASSESSMENT
[FreeTextEntry1] : 47M with overweight BMI, T2DM (7.0% to 6.6% 12/2021) presents for follow up of type 2 diabetes.\par \par 1. T2DM, overweight BMI\par -Last A1c 12/2021 6.6%. 3/7/22: 6.7%. 6/20/22: 6.1%\par -foot exam - normal 12/2021\par -eye exam - 08/2021 (new referral given to pt)\par -statin - would benefit from statin but wanted to wait. \par -urine microalbumin/cr ratio - 12/2021 neg. \par -pneumovax - 12/2021\par -overweight BMI - patient lost 12 lbs since last visit\par \par 2. 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 HLD\par -LDL 150s - needs statin. Repeating lipid profile today.\par addendum 6/20/22 3 pm increased rosuvastatin to 10 mg daily\par \par 3. R arm lipoma vs cyst - stable (also one on left side abdomen)\par -not bothersome so continue to monitor, can send to surgery if bothersome (pt wants to defer for now)\par \par 4. HCM\par -colon cancer screening: FIT test negative 09/2021. colonoscopy 01/28/2022 - diverticulosis and internal hemorrhoids.Has GI f/u in July. \par -HIV screening negative 09/2021\par -tdap - 07/2021\par -flu vaccine - 09/2021\par -covid vaccine - [employer] completed.booster done 12/27/21\par -pneumovax 12/6/2021. \par \par can f/u in 6m\par

## 2022-09-14 ENCOUNTER — NON-APPOINTMENT (OUTPATIENT)
Age: 47
End: 2022-09-14
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
## 2022-09-19 ENCOUNTER — APPOINTMENT (OUTPATIENT)
Dept: INTERNAL MEDICINE | Facility: CLINIC | Age: 47
End: 2022-09-19

## 2022-09-19 ENCOUNTER — APPOINTMENT (OUTPATIENT)
Dept: GASTROENTEROLOGY | Facility: CLINIC | Age: 47
End: 2022-09-19

## 2022-09-19 VITALS
DIASTOLIC BLOOD PRESSURE: 80 MMHG | TEMPERATURE: 98 F | SYSTOLIC BLOOD PRESSURE: 120 MMHG | OXYGEN SATURATION: 98 % | HEIGHT: 73 IN | HEART RATE: 79 BPM | WEIGHT: 198 LBS | BODY MASS INDEX: 26.24 KG/M2

## 2022-09-19 DIAGNOSIS — Z23 ENCOUNTER FOR IMMUNIZATION: ICD-10-CM

## 2022-09-19 DIAGNOSIS — K60.3 ANAL FISTULA: ICD-10-CM

## 2022-09-19 PROCEDURE — 99214 OFFICE O/P EST MOD 30 MIN: CPT

## 2022-09-19 NOTE — PHYSICAL EXAM
[No Respiratory Distress] : no respiratory distress [No Acc Muscle Use] : no accessory muscle use [Abdomen Tenderness] : non-tender [Abdomen Soft] : soft [Normal] : oriented to person, place, and time

## 2022-09-19 NOTE — HISTORY OF PRESENT ILLNESS
[FreeTextEntry1] : Here for follow up visit. \par Feels well overall. \par Normal bowel movements daily. \par Denies abd pain, n/v, hematochezia, melena, diarrhea, constipation, weight loss, rashes, eye pain joint pain. \par No complaints today.

## 2022-09-19 NOTE — ASSESSMENT
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 Here for follow up visit. \par \par - Colon cancer screening --> No fhx of colon cancer, avg risk. He is s/p colonoscopy Jan 2022 - reviewed w patient -- normal ileum, +diverticulosis, +internal hemorrhoids. Repeat in 10 yrs for screening purposes. \par - Reviewed natural hx of diverticulosis with patient. \par - discussed high fiber diet indefinitely\par - intermittent hematochezia likely 2/2 internal hemorrhoids in setting of ongoing constipation. Now improved w changing diet and prn miralax\par - Unclear etiology of prior perianal abscess and fistula (2014), no colonic or ileal findings to suggest IBD. Patient feels well w exception of well controlled constipation as above. No fhx of IBD. Normal CRP and fecal calprotectin. Will continue to monitor. \par \par RTC in 1 yr, sooner if any symptoms \par Continued to follow up w PCP\par

## 2022-09-22 ENCOUNTER — APPOINTMENT (OUTPATIENT)
Dept: OPHTHALMOLOGY | Facility: CLINIC | Age: 47
End: 2022-09-22

## 2022-09-22 ENCOUNTER — NON-APPOINTMENT (OUTPATIENT)
Age: 47
End: 2022-09-22

## 2022-09-22 PROCEDURE — 92015 DETERMINE REFRACTIVE STATE: CPT | Mod: NC

## 2022-09-22 PROCEDURE — 92004 COMPRE OPH EXAM NEW PT 1/>: CPT

## 2022-12-22 ENCOUNTER — APPOINTMENT (OUTPATIENT)
Dept: INTERNAL MEDICINE | Facility: CLINIC | Age: 47
End: 2022-12-22

## 2022-12-22 VITALS
HEART RATE: 75 BPM | HEIGHT: 73 IN | SYSTOLIC BLOOD PRESSURE: 126 MMHG | DIASTOLIC BLOOD PRESSURE: 82 MMHG | BODY MASS INDEX: 26.51 KG/M2 | OXYGEN SATURATION: 99 % | WEIGHT: 200 LBS

## 2022-12-22 DIAGNOSIS — G89.29 PAIN IN RIGHT SHOULDER: ICD-10-CM

## 2022-12-22 DIAGNOSIS — D17.20 BENIGN LIPOMATOUS NEOPLASM OF SKIN AND SUBCUTANEOUS TISSUE OF UNSPECIFIED LIMB: ICD-10-CM

## 2022-12-22 DIAGNOSIS — M25.511 PAIN IN RIGHT SHOULDER: ICD-10-CM

## 2022-12-22 DIAGNOSIS — E66.3 OVERWEIGHT: ICD-10-CM

## 2022-12-22 PROCEDURE — G0008: CPT

## 2022-12-22 PROCEDURE — G0444 DEPRESSION SCREEN ANNUAL: CPT | Mod: 59

## 2022-12-22 PROCEDURE — 90686 IIV4 VACC NO PRSV 0.5 ML IM: CPT

## 2022-12-22 PROCEDURE — 99396 PREV VISIT EST AGE 40-64: CPT | Mod: 25

## 2022-12-22 NOTE — ASSESSMENT
[FreeTextEntry1] : 47M with overweight BMI, well controlled T2DM/prediabetes presents for CPE\par \par 1. T2DM, overweight BMI (pt losing weight with diet/exercise), HLD\par -Last A1c 6/20/22: 6.1%, repeat today (pt on no medications)\par -foot exam - 12/2022, normal.\par -eye exam - 09/22/2022\par -statin - rosuvastatin 10 mg daily, repeat lipid profile today\par -urine microalbumin/cr ratio due: pending result\par -pneumovax - 12/2021\par \par 2. R arm lipoma vs cyst - stable (also one on left side abdomen)\par -previously patient did not want to see surgeon however now that he is having shoulder surgery he would like to have lipoma removed\par -surgery referral given to patient \par \par 3. HLD\par -on rosuvastatin 10 mg daily; repeat lipid profile\par \par 4. Right shoulder rotator cuff tear\par -planning for surgery in Jan 2023\par -no cardiac symptoms today\par -awaiting lab results\par \par 5. HCM\par -colon cancer screening: FIT test negative 09/2021. colonoscopy 01/28/2022 - repeat in 10 years\par -HIV screening negative 09/2021\par -tdap - 07/2021\par -flu vaccine - 12/2022\par -covid vaccine - [employer] completed.booster done 12/27/21\par -pneumovax 12/6/2021. \par \par can f/u in 3-6 months depending on lab results. \par

## 2022-12-22 NOTE — HEALTH RISK ASSESSMENT
[HIV Test offered] : HIV Test offered [Hepatitis C test offered] : Hepatitis C test offered [None] : None [With Family] : lives with family [Employed] : employed [] :  [Sexually Active] : sexually active [Feels Safe at Home] : Feels safe at home [Fully functional (bathing, dressing, toileting, transferring, walking, feeding)] : Fully functional (bathing, dressing, toileting, transferring, walking, feeding) [Fully functional (using the telephone, shopping, preparing meals, housekeeping, doing laundry, using] : Fully functional and needs no help or supervision to perform IADLs (using the telephone, shopping, preparing meals, housekeeping, doing laundry, using transportation, managing medications and managing finances) [Smoke Detector] : smoke detector [Carbon Monoxide Detector] : carbon monoxide detector [Seat Belt] :  uses seat belt [Sunscreen] : uses sunscreen [Good] : ~his/her~  mood as  good [Never] : Never [No] : In the past 12 months have you used drugs other than those required for medical reasons? No [0] : 2) Feeling down, depressed, or hopeless: Not at all (0) [PHQ-2 Negative - No further assessment needed] : PHQ-2 Negative - No further assessment needed [Patient reported colonoscopy was normal] : Patient reported colonoscopy was normal [Audit-CScore] : 0 [de-identified] : walking when it is not too cold [de-identified] : trying to eat healthy foods [IXQ1Geduv] : 0 [High Risk Behavior] : no high risk behavior [Reports changes in hearing] : Reports no changes in hearing [Reports changes in vision] : Reports no changes in vision [Reports changes in dental health] : Reports no changes in dental health [Guns at Home] : no guns at home [ColonoscopyDate] : 01/28/2022 [de-identified] : 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 reading glasses number increased

## 2022-12-22 NOTE — PHYSICAL EXAM
[No Acute Distress] : no acute distress [Well Nourished] : well nourished [Well Developed] : well developed [Well-Appearing] : well-appearing [Normal Sclera/Conjunctiva] : normal sclera/conjunctiva [PERRL] : pupils equal round and reactive to light [EOMI] : extraocular movements intact [Normal Outer Ear/Nose] : the outer ears and nose were normal in appearance [Normal Oropharynx] : the oropharynx was normal [Normal TMs] : both tympanic membranes were normal [No JVD] : no jugular venous distention [No Lymphadenopathy] : no lymphadenopathy [Supple] : supple [Thyroid Normal, No Nodules] : the thyroid was normal and there were no nodules present [No Respiratory Distress] : no respiratory distress  [No Accessory Muscle Use] : no accessory muscle use [Clear to Auscultation] : lungs were clear to auscultation bilaterally [Normal Rate] : normal rate  [Regular Rhythm] : with a regular rhythm [Normal S1, S2] : normal S1 and S2 [No Murmur] : no murmur heard [No Varicosities] : no varicosities [Pedal Pulses Present] : the pedal pulses are present [No Edema] : there was no peripheral edema [No Palpable Aorta] : no palpable aorta [No Extremity Clubbing/Cyanosis] : no extremity clubbing/cyanosis [Soft] : abdomen soft [Non Tender] : non-tender [Non-distended] : non-distended [No Masses] : no abdominal mass palpated [No HSM] : no HSM [Normal Bowel Sounds] : normal bowel sounds [Normal Supraclavicular Nodes] : no supraclavicular lymphadenopathy [Normal Posterior Cervical Nodes] : no posterior cervical lymphadenopathy [Normal Anterior Cervical Nodes] : no anterior cervical lymphadenopathy [No CVA Tenderness] : no CVA  tenderness [No Spinal Tenderness] : no spinal tenderness [No Joint Swelling] : no joint swelling [Grossly Normal Strength/Tone] : grossly normal strength/tone [No Rash] : no rash [Coordination Grossly Intact] : coordination grossly intact [No Focal Deficits] : no focal deficits [Normal Gait] : normal gait [Normal Affect] : the affect was normal [Normal Insight/Judgement] : insight and judgment were intact [Comprehensive Foot Exam Normal] : Right and left foot were examined and both feet are normal. No ulcers in either foot. Toes are normal and with full ROM.  Normal tactile sensation with monofilament testing throughout both feet
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
## 2022-12-23 LAB
ALBUMIN SERPL ELPH-MCNC: 4.8 G/DL
ALP BLD-CCNC: 71 U/L
ALT SERPL-CCNC: 15 U/L
ANION GAP SERPL CALC-SCNC: 9 MMOL/L
AST SERPL-CCNC: 19 U/L
BASOPHILS # BLD AUTO: 0.02 K/UL
BASOPHILS NFR BLD AUTO: 0.4 %
BILIRUB SERPL-MCNC: 0.6 MG/DL
BUN SERPL-MCNC: 19 MG/DL
CALCIUM SERPL-MCNC: 10 MG/DL
CHLORIDE SERPL-SCNC: 101 MMOL/L
CHOLEST SERPL-MCNC: 207 MG/DL
CO2 SERPL-SCNC: 30 MMOL/L
CREAT SERPL-MCNC: 0.73 MG/DL
CREAT SPEC-SCNC: 129 MG/DL
EGFR: 114 ML/MIN/1.73M2
EOSINOPHIL # BLD AUTO: 0.07 K/UL
EOSINOPHIL NFR BLD AUTO: 1.6 %
ESTIMATED AVERAGE GLUCOSE: 126 MG/DL
GLUCOSE SERPL-MCNC: 115 MG/DL
HBA1C MFR BLD HPLC: 6 %
HCT VFR BLD CALC: 47.4 %
HDLC SERPL-MCNC: 39 MG/DL
HGB BLD-MCNC: 15.7 G/DL
IMM GRANULOCYTES NFR BLD AUTO: 0.2 %
LDLC SERPL CALC-MCNC: 148 MG/DL
LYMPHOCYTES # BLD AUTO: 1.31 K/UL
LYMPHOCYTES NFR BLD AUTO: 29.3 %
MAN DIFF?: NORMAL
MCHC RBC-ENTMCNC: 28.8 PG
MCHC RBC-ENTMCNC: 33.1 GM/DL
MCV RBC AUTO: 87 FL
MICROALBUMIN 24H UR DL<=1MG/L-MCNC: <1.2 MG/DL
MICROALBUMIN/CREAT 24H UR-RTO: NORMAL MG/G
MONOCYTES # BLD AUTO: 0.36 K/UL
MONOCYTES NFR BLD AUTO: 8.1 %
NEUTROPHILS # BLD AUTO: 2.7 K/UL
NEUTROPHILS NFR BLD AUTO: 60.4 %
NONHDLC SERPL-MCNC: 168 MG/DL
PLATELET # BLD AUTO: 234 K/UL
POTASSIUM SERPL-SCNC: 5.1 MMOL/L
PROT SERPL-MCNC: 7 G/DL
RBC # BLD: 5.45 M/UL
RBC # FLD: 12.3 %
SODIUM SERPL-SCNC: 140 MMOL/L
TRIGL SERPL-MCNC: 103 MG/DL
WBC # FLD AUTO: 4.47 K/UL

## 2023-02-10 ENCOUNTER — NON-APPOINTMENT (OUTPATIENT)
Age: 48
End: 2023-02-10

## 2023-02-13 ENCOUNTER — APPOINTMENT (OUTPATIENT)
Dept: INTERNAL MEDICINE | Facility: CLINIC | Age: 48
End: 2023-02-13
Payer: MEDICAID

## 2023-02-13 VITALS
OXYGEN SATURATION: 97 % | DIASTOLIC BLOOD PRESSURE: 68 MMHG | HEART RATE: 97 BPM | HEIGHT: 73 IN | WEIGHT: 203 LBS | BODY MASS INDEX: 26.9 KG/M2 | SYSTOLIC BLOOD PRESSURE: 108 MMHG

## 2023-02-13 DIAGNOSIS — E87.5 HYPERKALEMIA: ICD-10-CM

## 2023-02-13 DIAGNOSIS — L23.89 ALLERGIC CONTACT DERMATITIS DUE TO OTHER AGENTS: ICD-10-CM

## 2023-02-13 PROCEDURE — 99213 OFFICE O/P EST LOW 20 MIN: CPT

## 2023-02-14 LAB
ANION GAP SERPL CALC-SCNC: 16 MMOL/L
BUN SERPL-MCNC: 19 MG/DL
CALCIUM SERPL-MCNC: 10.2 MG/DL
CHLORIDE SERPL-SCNC: 98 MMOL/L
CO2 SERPL-SCNC: 26 MMOL/L
CREAT SERPL-MCNC: 0.7 MG/DL
EGFR: 115 ML/MIN/1.73M2
GLUCOSE SERPL-MCNC: 119 MG/DL
POTASSIUM SERPL-SCNC: 4.5 MMOL/L
SODIUM SERPL-SCNC: 140 MMOL/L

## 2023-02-22 NOTE — PHYSICAL EXAM
[Skin Lesions 1] : Skin lesion: [Red] : red [Normal] : affect was normal and insight and judgment were intact [FreeTextEntry3] : erythematous on chest just below clavicles. some small excoriated raised papules. no active discharge or bleeding. blanching.

## 2023-02-22 NOTE — ASSESSMENT
[FreeTextEntry1] : 47M with overweight BMI, well controlled T2DM/prediabetes presents for acute visit for rash\par \par 1. Rash\par -denies any new allergic contact\par -stop rubbing alcohol\par -can try hydroxizine for itching\par -try hydrocortisone cream\par -RTC if no improvement\par -try fragrance/dye free soaps/bath products/detergent. \par \par 2. Hyperkalemia on urgent care labs \par -reviewed cbc, cmp, repeat K today\par \par 3. T2DM, overweight BMI (pt losing weight with diet/exercise), HLD\par -Last A1c 6/20/22: 6.1%, repeat 12/2022 6.0%.\par -foot exam - 12/2022, normal.\par -eye exam - 09/22/2022\par -statin - rosuvastatin 10 mg daily\par -urine microalbumin/cr ratio due: neg 12/2022\par -pneumovax - 12/2021\par \par 4. R arm lipoma - removed by surgery in late January \par \par 5. HCM\par -colon cancer screening: FIT test negative 09/2021. colonoscopy 01/28/2022 - repeat in 10 years\par -HIV screening negative 09/2021\par -tdap - 07/2021\par -flu vaccine - 12/2022\par -covid vaccine - pfizer completed.booster done 12/27/21\par -pneumovax 12/6/2021. \par \par \par ADDENDUM\par Date 02/22/2023 \par Received fax from Dr. Singleton - medical clearance needed. \par CBC from 12/2022 was normal\par BMP done 02/13/2023 also normal\par Patient had not complained of chest pain or SOB. He reports successful removal of lipoma in January 2023. \par Patient is medically optimized to c/w additional resection of lipoma on forearm and left flank. \par

## 2023-02-22 NOTE — HISTORY OF PRESENT ILLNESS
[Rash (Location) ___] : rash on [unfilled] [Mild] : mild [___ Days ago] : [unfilled] days ago [Nocturnal] : nocturnal [At Night] : at night [Improving] : improving [de-identified] : Rash that is more itchy at night [FreeTextEntry7] : chest [FreeTextEntry5] : rubbing alcohol [FreeTextEntry6] : hydroxizine helps [FreeTextEntry8] : 47M with overweight BMI, well controlled T2DM/prediabetes presents for acute visit for rash - red, itchy, did not try anything other than rubbing alcohol as some excoriated parts were bleeding. Visited Urgent care 2/11 as our office is closed on weekends. \par \par Reviewed note from urgent care 2/11/23\par Reviewed cbc, cmp\par Hyperkalemia is likely lab error; repeat today\par needs meds renewed to [facility] not [facility]\par

## 2023-02-24 ENCOUNTER — OUTPATIENT (OUTPATIENT)
Dept: OUTPATIENT SERVICES | Facility: HOSPITAL | Age: 48
LOS: 1 days | End: 2023-02-24

## 2023-02-24 VITALS
HEIGHT: 72.5 IN | WEIGHT: 207.01 LBS | TEMPERATURE: 97 F | DIASTOLIC BLOOD PRESSURE: 78 MMHG | OXYGEN SATURATION: 97 % | RESPIRATION RATE: 16 BRPM | SYSTOLIC BLOOD PRESSURE: 131 MMHG | HEART RATE: 115 BPM

## 2023-02-24 DIAGNOSIS — K42.9 UMBILICAL HERNIA WITHOUT OBSTRUCTION OR GANGRENE: Chronic | ICD-10-CM

## 2023-02-24 DIAGNOSIS — D17.1 BENIGN LIPOMATOUS NEOPLASM OF SKIN AND SUBCUTANEOUS TISSUE OF TRUNK: ICD-10-CM

## 2023-02-24 DIAGNOSIS — Z98.890 OTHER SPECIFIED POSTPROCEDURAL STATES: Chronic | ICD-10-CM

## 2023-02-24 DIAGNOSIS — E78.5 HYPERLIPIDEMIA, UNSPECIFIED: ICD-10-CM

## 2023-02-24 PROCEDURE — 93010 ELECTROCARDIOGRAM REPORT: CPT
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
## 2023-02-24 NOTE — H&P PST ADULT - PROBLEM SELECTOR PLAN 1
pt scheduled for excision lipoma right forearm, excision lipoma x 2 on left flank on 03/02/23  Preop instructions provided. Pt verbalized understanding.   Pepcid for GI prophylaxis with written and verbal instruction provided    written and verbal instructions with teach back on chlorhexidine shampoo provided,  pt verbalized understanding   pt aware of need for COVID testing 3-5 days preop, states has appt on 02/27/23 pt scheduled for excision lipoma right forearm, excision lipoma x 2 on left flank on 03/02/23  Preop instructions provided. Pt verbalized understanding.   Pepcid for GI prophylaxis with written and verbal instruction provided    written and verbal instructions with teach back on chlorhexidine shampoo provided,  pt verbalized understanding   pt aware of need for COVID testing 3-5 days preop, states has appt on 02/27/23  CBC, BMP, A1C from PCP in chart, EKG done @PST pt scheduled for excision lipoma right forearm, excision lipoma x 2 on left flank on 03/02/23  Preop instructions provided. Pt verbalized understanding.   Pepcid for GI prophylaxis with written and verbal instruction provided    written and verbal instructions with teach back on chlorhexidine shampoo provided,  pt verbalized understanding   pt aware of need for COVID testing 3-5 days preop, states has appt on 02/27/23  CBC, BMP, A1C from PCP in chart, pt -115, EKG done @PST, pt denies CP, palpitations, SOB
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
## 2023-02-24 NOTE — H&P PST ADULT - CARDIOVASCULAR
details… , pt denies chest pain, palpitations, SOB, BP stable/S1 S2 present/no gallops/no rub/no murmur/tachycardia -115, pt denies chest pain, palpitations, SOB, BP stable/S1 S2 present/no gallops/no rub/no murmur/tachycardia

## 2023-02-24 NOTE — H&P PST ADULT - NSICDXPASTSURGICALHX_GEN_ALL_CORE_FT
PAST SURGICAL HISTORY:  Hernia, umbilical     History of incision and drainage     S/P arthroscopy of shoulder

## 2023-02-24 NOTE — H&P PST ADULT - HISTORY OF PRESENT ILLNESS
47 y.o. male presents to PST with preop diagnosis of benign lipomatous neoplasm of skin and subcutaneous trunk, scheduled for excision lipoma right forearm, excision lipoma x2 on left flank, reports h/o lipoma on right forearm and left trunk "several years", reports it increases in size, c/o intermittent pain

## 2023-03-01 ENCOUNTER — TRANSCRIPTION ENCOUNTER (OUTPATIENT)
Age: 48
End: 2023-03-01

## 2023-03-02 ENCOUNTER — TRANSCRIPTION ENCOUNTER (OUTPATIENT)
Age: 48
End: 2023-03-02

## 2023-03-02 ENCOUNTER — OUTPATIENT (OUTPATIENT)
Dept: OUTPATIENT SERVICES | Facility: HOSPITAL | Age: 48
LOS: 1 days | Discharge: HOME | End: 2023-03-02
Payer: MEDICAID

## 2023-03-02 VITALS
TEMPERATURE: 99 F | RESPIRATION RATE: 14 BRPM | OXYGEN SATURATION: 99 % | DIASTOLIC BLOOD PRESSURE: 79 MMHG | SYSTOLIC BLOOD PRESSURE: 120 MMHG | HEART RATE: 72 BPM

## 2023-03-02 VITALS
HEIGHT: 72.5 IN | OXYGEN SATURATION: 99 % | SYSTOLIC BLOOD PRESSURE: 118 MMHG | RESPIRATION RATE: 18 BRPM | DIASTOLIC BLOOD PRESSURE: 80 MMHG | TEMPERATURE: 97 F | WEIGHT: 207.01 LBS | HEART RATE: 68 BPM

## 2023-03-02 DIAGNOSIS — K42.9 UMBILICAL HERNIA WITHOUT OBSTRUCTION OR GANGRENE: Chronic | ICD-10-CM

## 2023-03-02 DIAGNOSIS — D17.1 BENIGN LIPOMATOUS NEOPLASM OF SKIN AND SUBCUTANEOUS TISSUE OF TRUNK: ICD-10-CM

## 2023-03-02 DIAGNOSIS — Z98.890 OTHER SPECIFIED POSTPROCEDURAL STATES: Chronic | ICD-10-CM

## 2023-03-02 PROCEDURE — 88304 TISSUE EXAM BY PATHOLOGIST: CPT | Mod: 26

## 2023-03-02 RX ORDER — IBUPROFEN 200 MG
1 TABLET ORAL
Qty: 0 | Refills: 0 | DISCHARGE
Start: 2023-03-02

## 2023-03-02 RX ORDER — ACETAMINOPHEN 500 MG
2 TABLET ORAL
Qty: 0 | Refills: 0 | DISCHARGE

## 2023-03-02 RX ORDER — HYDROXYZINE HCL 10 MG
1 TABLET ORAL
Qty: 0 | Refills: 0 | DISCHARGE

## 2023-03-02 RX ORDER — ROSUVASTATIN CALCIUM 5 MG/1
1 TABLET ORAL
Qty: 0 | Refills: 0 | DISCHARGE

## 2023-03-02 RX ORDER — SODIUM CHLORIDE 9 MG/ML
1000 INJECTION, SOLUTION INTRAVENOUS
Refills: 0 | Status: DISCONTINUED | OUTPATIENT
Start: 2023-03-02 | End: 2023-03-16

## 2023-03-02 RX ORDER — IBUPROFEN 200 MG
400 TABLET ORAL EVERY 6 HOURS
Refills: 0 | Status: DISCONTINUED | OUTPATIENT
Start: 2023-03-02 | End: 2023-03-16

## 2023-03-02 RX ORDER — HYDROCORTISONE 1 %
0 OINTMENT (GRAM) TOPICAL
Qty: 0 | Refills: 0 | DISCHARGE

## 2023-03-02 RX ORDER — ACETAMINOPHEN 500 MG
975 TABLET ORAL EVERY 6 HOURS
Refills: 0 | Status: DISCONTINUED | OUTPATIENT
Start: 2023-03-02 | End: 2023-03-16

## 2023-03-02 NOTE — BRIEF OPERATIVE NOTE - NSICDXBRIEFPROCEDURE_GEN_ALL_CORE_FT
PROCEDURES:  Excision, subcutaneous tumor, forearm area, less than 3 cm 02-Mar-2023 12:48:48  Joey Thompson  Excision, mass, soft tissue, back 02-Mar-2023 12:49:18  Joey Thompson

## 2023-03-02 NOTE — ASU DISCHARGE PLAN (ADULT/PEDIATRIC) - ASU DC SPECIAL INSTRUCTIONSFT
Please use alternating doses of Tylenol 975mg and Motrin 400mg every 6 hours. Ok to shower 1 day after surgery, do not submerge wound in water. Please schedule your follow-up appointment in 1-2 weeks with your surgeon.

## 2023-03-15 LAB — SURGICAL PATHOLOGY STUDY: SIGNIFICANT CHANGE UP

## 2023-06-22 ENCOUNTER — APPOINTMENT (OUTPATIENT)
Dept: INTERNAL MEDICINE | Facility: CLINIC | Age: 48
End: 2023-06-22

## 2023-09-19 PROBLEM — D17.1 BENIGN LIPOMATOUS NEOPLASM OF SKIN AND SUBCUTANEOUS TISSUE OF TRUNK: Chronic | Status: ACTIVE | Noted: 2023-02-24

## 2023-09-25 ENCOUNTER — APPOINTMENT (OUTPATIENT)
Dept: GASTROENTEROLOGY | Facility: CLINIC | Age: 48
End: 2023-09-25

## 2023-11-08 ENCOUNTER — APPOINTMENT (OUTPATIENT)
Dept: GASTROENTEROLOGY | Facility: CLINIC | Age: 48
End: 2023-11-08
Payer: MEDICAID

## 2023-11-08 VITALS
SYSTOLIC BLOOD PRESSURE: 108 MMHG | OXYGEN SATURATION: 98 % | HEART RATE: 87 BPM | WEIGHT: 204 LBS | BODY MASS INDEX: 27.04 KG/M2 | HEIGHT: 73 IN | DIASTOLIC BLOOD PRESSURE: 77 MMHG

## 2023-11-08 DIAGNOSIS — K64.8 OTHER HEMORRHOIDS: ICD-10-CM

## 2023-11-08 DIAGNOSIS — K57.30 DIVERTICULOSIS OF LARGE INTESTINE W/OUT PERFORATION OR ABSCESS W/OUT BLEEDING: ICD-10-CM

## 2023-11-08 DIAGNOSIS — K59.00 CONSTIPATION, UNSPECIFIED: ICD-10-CM

## 2023-11-08 PROCEDURE — 99213 OFFICE O/P EST LOW 20 MIN: CPT

## 2023-11-09 PROBLEM — K59.00 CONSTIPATION, UNSPECIFIED CONSTIPATION TYPE: Status: ACTIVE | Noted: 2021-10-07

## 2023-11-09 PROBLEM — K64.8 INTERNAL HEMORRHOIDS: Status: ACTIVE | Noted: 2022-02-09

## 2023-11-09 PROBLEM — K57.30 DIVERTICULOSIS OF COLON: Status: ACTIVE | Noted: 2022-02-09

## 2023-12-07 NOTE — REVIEW OF SYSTEMS
[see HPI] : see HPI [Limb Pain] : limb pain [Negative] : Psychiatric [Fever] : no fever [Chills] : no chills [Loss Of Hearing] : no hearing loss [Nasal Discharge] : no nasal discharge [Chest Pain] : no chest pain [Shortness Of Breath] : no shortness of breath [Cough] : no cough [Wheezing] : no wheezing [Vomiting] : no vomiting [Constipation] : no constipation [Diarrhea] : no diarrhea [Skin Lesions] : no skin lesions IV discontinued, cath removed intact

## 2024-02-16 NOTE — PHYSICAL EXAM
[No Acute Distress] : no acute distress [Well Nourished] : well nourished [Well Developed] : well developed [Well-Appearing] : well-appearing [Normal Sclera/Conjunctiva] : normal sclera/conjunctiva [PERRL] : pupils equal round and reactive to light [EOMI] : extraocular movements intact [Normal Oropharynx] : the oropharynx was normal [Normal Outer Ear/Nose] : the outer ears and nose were normal in appearance [Normal TMs] : both tympanic membranes were normal [No JVD] : no jugular venous distention [No Lymphadenopathy] : no lymphadenopathy [Supple] : supple [Thyroid Normal, No Nodules] : the thyroid was normal and there were no nodules present [No Respiratory Distress] : no respiratory distress  [No Accessory Muscle Use] : no accessory muscle use [Normal Rate] : normal rate  [Clear to Auscultation] : lungs were clear to auscultation bilaterally [Regular Rhythm] : with a regular rhythm [Normal S1, S2] : normal S1 and S2 [No Murmur] : no murmur heard [No Varicosities] : no varicosities [Pedal Pulses Present] : the pedal pulses are present [No Palpable Aorta] : no palpable aorta [No Edema] : there was no peripheral edema [No Extremity Clubbing/Cyanosis] : no extremity clubbing/cyanosis [Soft] : abdomen soft [Non Tender] : non-tender [Non-distended] : non-distended [No Masses] : no abdominal mass palpated [No HSM] : no HSM [Normal Bowel Sounds] : normal bowel sounds [Normal Supraclavicular Nodes] : no supraclavicular lymphadenopathy [Normal Posterior Cervical Nodes] : no posterior cervical lymphadenopathy [Normal Anterior Cervical Nodes] : no anterior cervical lymphadenopathy [No CVA Tenderness] : no CVA  tenderness [No Spinal Tenderness] : no spinal tenderness [No Joint Swelling] : no joint swelling [Grossly Normal Strength/Tone] : grossly normal strength/tone [No Rash] : no rash [Coordination Grossly Intact] : coordination grossly intact [No Focal Deficits] : no focal deficits [Normal Gait] : normal gait [Normal Affect] : the affect was normal [Normal Insight/Judgement] : insight and judgment were intact [Comprehensive Foot Exam Normal] : Right and left foot were examined and both feet are normal. No ulcers in either foot. Toes are normal and with full ROM.  Normal tactile sensation with monofilament testing throughout both feet

## 2024-02-20 ENCOUNTER — OUTPATIENT (OUTPATIENT)
Dept: OUTPATIENT SERVICES | Facility: HOSPITAL | Age: 49
LOS: 1 days | End: 2024-02-20
Payer: COMMERCIAL

## 2024-02-20 ENCOUNTER — APPOINTMENT (OUTPATIENT)
Dept: INTERNAL MEDICINE | Facility: CLINIC | Age: 49
End: 2024-02-20
Payer: COMMERCIAL

## 2024-02-20 VITALS
SYSTOLIC BLOOD PRESSURE: 102 MMHG | HEART RATE: 79 BPM | HEIGHT: 73 IN | WEIGHT: 185 LBS | BODY MASS INDEX: 24.52 KG/M2 | OXYGEN SATURATION: 98 % | DIASTOLIC BLOOD PRESSURE: 74 MMHG

## 2024-02-20 DIAGNOSIS — Z13.31 ENCOUNTER FOR SCREENING FOR DEPRESSION: ICD-10-CM

## 2024-02-20 DIAGNOSIS — E78.5 HYPERLIPIDEMIA, UNSPECIFIED: ICD-10-CM

## 2024-02-20 DIAGNOSIS — R63.1 POLYDIPSIA: ICD-10-CM

## 2024-02-20 DIAGNOSIS — Z13.39 ENCOUNTER FOR SCREENING EXAM FOR OTHER MENTAL HEALTH AND BEHAVIORAL DISORDERS: ICD-10-CM

## 2024-02-20 DIAGNOSIS — Z13.39 ENCOUNTER FOR SCREENING EXAMINATION FOR OTHER MENTAL HEALTH AND BEHAVIORAL DISORDERS: ICD-10-CM

## 2024-02-20 DIAGNOSIS — Z23 ENCOUNTER FOR IMMUNIZATION: ICD-10-CM

## 2024-02-20 DIAGNOSIS — E11.9 TYPE 2 DIABETES MELLITUS WITHOUT COMPLICATIONS: ICD-10-CM

## 2024-02-20 DIAGNOSIS — Z98.890 OTHER SPECIFIED POSTPROCEDURAL STATES: Chronic | ICD-10-CM

## 2024-02-20 DIAGNOSIS — Z00.00 ENCOUNTER FOR GENERAL ADULT MEDICAL EXAMINATION WITHOUT ABNORMAL FINDINGS: ICD-10-CM

## 2024-02-20 DIAGNOSIS — I10 ESSENTIAL (PRIMARY) HYPERTENSION: ICD-10-CM

## 2024-02-20 DIAGNOSIS — Z00.00 ENCOUNTER FOR GENERAL ADULT MEDICAL EXAMINATION W/OUT ABNORMAL FINDINGS: ICD-10-CM

## 2024-02-20 PROCEDURE — G0009: CPT

## 2024-02-20 PROCEDURE — 90677 PCV20 VACCINE IM: CPT

## 2024-02-20 PROCEDURE — G0444 DEPRESSION SCREEN ANNUAL: CPT | Mod: 59

## 2024-02-20 PROCEDURE — 99396 PREV VISIT EST AGE 40-64: CPT

## 2024-02-20 PROCEDURE — G0463: CPT

## 2024-02-20 PROCEDURE — G0442 ANNUAL ALCOHOL SCREEN 15 MIN: CPT | Mod: 59

## 2024-02-20 PROCEDURE — 90686 IIV4 VACC NO PRSV 0.5 ML IM: CPT

## 2024-02-20 PROCEDURE — G0008: CPT

## 2024-02-20 RX ORDER — CHOLECALCIFEROL (VITAMIN D3) 50 MCG
50 MCG TABLET ORAL
Qty: 90 | Refills: 1 | Status: DISCONTINUED | COMMUNITY
Start: 2019-03-17 | End: 2024-02-20

## 2024-02-20 RX ORDER — ROSUVASTATIN CALCIUM 10 MG/1
10 TABLET, FILM COATED ORAL
Qty: 90 | Refills: 3 | Status: ACTIVE | COMMUNITY
Start: 2022-03-08 | End: 1900-01-01

## 2024-02-20 RX ORDER — HYDROCORTISONE 10 MG/G
1 OINTMENT TOPICAL
Qty: 1 | Refills: 2 | Status: DISCONTINUED | COMMUNITY
Start: 2023-02-13 | End: 2024-02-20

## 2024-02-24 ENCOUNTER — NON-APPOINTMENT (OUTPATIENT)
Age: 49
End: 2024-02-24

## 2024-02-24 PROBLEM — R63.1 POLYDIPSIA: Status: ACTIVE | Noted: 2024-02-24

## 2024-02-24 LAB
25(OH)D3 SERPL-MCNC: 19.1 NG/ML
ALBUMIN SERPL ELPH-MCNC: 4.9 G/DL
ALP BLD-CCNC: 121 U/L
ALT SERPL-CCNC: 27 U/L
ANION GAP SERPL CALC-SCNC: 10 MMOL/L
AST SERPL-CCNC: 22 U/L
BASOPHILS # BLD AUTO: 0.02 K/UL
BASOPHILS NFR BLD AUTO: 0.5 %
BILIRUB SERPL-MCNC: 0.8 MG/DL
BUN SERPL-MCNC: 15 MG/DL
CALCIUM SERPL-MCNC: 9.8 MG/DL
CHLORIDE SERPL-SCNC: 97 MMOL/L
CHOLEST SERPL-MCNC: 252 MG/DL
CO2 SERPL-SCNC: 28 MMOL/L
CREAT SERPL-MCNC: 0.64 MG/DL
CREAT SPEC-SCNC: 112 MG/DL
EGFR: 118 ML/MIN/1.73M2
EOSINOPHIL # BLD AUTO: 0.07 K/UL
EOSINOPHIL NFR BLD AUTO: 1.6 %
ESTIMATED AVERAGE GLUCOSE: 292 MG/DL
GLUCOSE SERPL-MCNC: 326 MG/DL
HBA1C MFR BLD HPLC: 11.8 %
HCT VFR BLD CALC: 48 %
HDLC SERPL-MCNC: 43 MG/DL
HGB BLD-MCNC: 16.2 G/DL
IMM GRANULOCYTES NFR BLD AUTO: 0.2 %
LDLC SERPL CALC-MCNC: 168 MG/DL
LYMPHOCYTES # BLD AUTO: 1.37 K/UL
LYMPHOCYTES NFR BLD AUTO: 31.7 %
MAN DIFF?: NORMAL
MCHC RBC-ENTMCNC: 28.8 PG
MCHC RBC-ENTMCNC: 33.8 GM/DL
MCV RBC AUTO: 85.3 FL
MICROALBUMIN 24H UR DL<=1MG/L-MCNC: 2.1 MG/DL
MICROALBUMIN/CREAT 24H UR-RTO: 19 MG/G
MONOCYTES # BLD AUTO: 0.37 K/UL
MONOCYTES NFR BLD AUTO: 8.6 %
NEUTROPHILS # BLD AUTO: 2.48 K/UL
NEUTROPHILS NFR BLD AUTO: 57.4 %
NONHDLC SERPL-MCNC: 209 MG/DL
PLATELET # BLD AUTO: 229 K/UL
POTASSIUM SERPL-SCNC: 4.5 MMOL/L
PROT SERPL-MCNC: 6.9 G/DL
RBC # BLD: 5.63 M/UL
RBC # FLD: 12.2 %
SODIUM SERPL-SCNC: 136 MMOL/L
T4 FREE SERPL-MCNC: 1.3 NG/DL
TRIGL SERPL-MCNC: 219 MG/DL
TSH SERPL-ACNC: 1.99 UIU/ML
WBC # FLD AUTO: 4.32 K/UL

## 2024-02-24 RX ORDER — METFORMIN HYDROCHLORIDE 500 MG/1
500 TABLET, COATED ORAL
Qty: 360 | Refills: 3 | Status: ACTIVE | COMMUNITY
Start: 2024-02-24 | End: 1900-01-01

## 2024-02-24 RX ORDER — DOCOSAHEXAENOIC ACID 300 MG
50 MCG CAPSULE ORAL
Qty: 90 | Refills: 3 | Status: ACTIVE | COMMUNITY
Start: 2024-02-24 | End: 1900-01-01

## 2024-02-26 PROBLEM — Z13.39 SCREENING FOR ALCOHOLISM: Status: ACTIVE | Noted: 2024-02-26

## 2024-02-26 PROBLEM — Z13.31 SCREENING FOR DEPRESSION: Status: ACTIVE | Noted: 2024-02-26

## 2024-02-26 NOTE — HISTORY OF PRESENT ILLNESS
[FreeTextEntry1] : Patient presents today for annual physical exam\par   [de-identified] : Mr. JESE LAGUNAS is a 48 year old Other race  male  with history of  overweight BMI, well controlled T2DM/prediabetes, HLD, Vit D deficiency presented today for comprehensive evaluation. Last seen by Dr. Shoemaker 2/13/23.  Pt came alone and reports stable conditions except severe night time thirsty, dry mouth that woke him up from sleep.  He ran out Rosuvastatin for past 3months and needs refill.  Mom had breast ca at age 49 and passed due to the ca. He wishes all preventive lab test.  Denied fever, chills,CP, SOB, abdominal pain, n/v/c/d.

## 2024-02-26 NOTE — HEALTH RISK ASSESSMENT
[Good] : ~his/her~  mood as  good [No] : In the past 12 months have you used drugs other than those required for medical reasons? No [0] : 2) Feeling down, depressed, or hopeless: Not at all (0) [PHQ-2 Negative - No further assessment needed] : PHQ-2 Negative - No further assessment needed [Patient reported colonoscopy was normal] : Patient reported colonoscopy was normal [HIV Test offered] : HIV Test offered [Hepatitis C test offered] : Hepatitis C test offered [None] : None [With Family] : lives with family [Employed] : employed [] :  [Sexually Active] : sexually active [Feels Safe at Home] : Feels safe at home [Fully functional (bathing, dressing, toileting, transferring, walking, feeding)] : Fully functional (bathing, dressing, toileting, transferring, walking, feeding) [Fully functional (using the telephone, shopping, preparing meals, housekeeping, doing laundry, using] : Fully functional and needs no help or supervision to perform IADLs (using the telephone, shopping, preparing meals, housekeeping, doing laundry, using transportation, managing medications and managing finances) [Smoke Detector] : smoke detector [Carbon Monoxide Detector] : carbon monoxide detector [Seat Belt] :  uses seat belt [Sunscreen] : uses sunscreen [de-identified] : see hpi [No falls in past year] : Patient reported no falls in the past year [de-identified] : walking when it is not too cold [Audit-CScore] : 0 [de-identified] : trying to eat healthy foods [[id number]] : 0 [High Risk Behavior] : no high risk behavior [Reports changes in hearing] : Reports no changes in hearing [Reports changes in vision] : Reports no changes in vision [Reports changes in dental health] : Reports no changes in dental health [Guns at Home] : no guns at home [ColonoscopyDate] : 01/28/2022 [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 reading glasses number increased [Never] : Never [AdvancecareDate] : 02/24

## 2024-02-26 NOTE — ASSESSMENT
[FreeTextEntry1] : Mr. JESE LAGUNAS is a 48 year old Other race  male  with history of  overweight BMI, well controlled T2DM/prediabetes, HLD, Vit D deficiency presented today for comprehensive evaluation.  # HCM -colon cancer screening: FIT test negative 09/2021. colonoscopy 01/28/2022 - repeat in 10 years -HIV screening: negative 09/2021 -tdap - 07/2021 utd -flu vaccine - pt is amenable to get today. -covid vaccine - pfizer completed. booster done 12/27/21 -pneumovax:  12/6/2021. Pt is amenable to get PCV20 today.  -PHQ2, SBIRT: negative.   # T2DM, overweight BMI (pt losing weight with diet/exercise), Well-controlled at this time. Last A1C 6.0 12/22/22. Pt complains of severe dry mouth, but no oral lesion by exam.  check serum A1C.   # HLD Sent rx for Rosuvastatin 10mg qd today as he ran out of it for 3months.  Continue take statin regularly and low fat diet. check lipid panel.  # Rt arm lipoma vs cyst - stable (also one on left side abdomen) stable, clinically monitor -Dr. Shoemaker gave pt surgery referral, but pt did not go.   -check lab as planned. -F/up in 4-6months  or prn.

## 2024-04-11 ENCOUNTER — NON-APPOINTMENT (OUTPATIENT)
Age: 49
End: 2024-04-11

## 2024-04-11 ENCOUNTER — APPOINTMENT (OUTPATIENT)
Dept: OPHTHALMOLOGY | Facility: CLINIC | Age: 49
End: 2024-04-11
Payer: COMMERCIAL

## 2024-04-11 PROCEDURE — 92014 COMPRE OPH EXAM EST PT 1/>: CPT

## 2024-04-12 ENCOUNTER — NON-APPOINTMENT (OUTPATIENT)
Age: 49
End: 2024-04-12

## 2024-06-13 ENCOUNTER — APPOINTMENT (OUTPATIENT)
Dept: INTERNAL MEDICINE | Facility: CLINIC | Age: 49
End: 2024-06-13
Payer: COMMERCIAL

## 2024-06-13 ENCOUNTER — OUTPATIENT (OUTPATIENT)
Dept: OUTPATIENT SERVICES | Facility: HOSPITAL | Age: 49
LOS: 1 days | End: 2024-06-13
Payer: COMMERCIAL

## 2024-06-13 VITALS
SYSTOLIC BLOOD PRESSURE: 110 MMHG | DIASTOLIC BLOOD PRESSURE: 70 MMHG | OXYGEN SATURATION: 98 % | HEIGHT: 73 IN | WEIGHT: 195 LBS | HEART RATE: 90 BPM | BODY MASS INDEX: 25.84 KG/M2

## 2024-06-13 DIAGNOSIS — E11.9 TYPE 2 DIABETES MELLITUS W/OUT COMPLICATIONS: ICD-10-CM

## 2024-06-13 DIAGNOSIS — K42.9 UMBILICAL HERNIA WITHOUT OBSTRUCTION OR GANGRENE: Chronic | ICD-10-CM

## 2024-06-13 DIAGNOSIS — E55.9 VITAMIN D DEFICIENCY, UNSPECIFIED: ICD-10-CM

## 2024-06-13 DIAGNOSIS — Z98.890 OTHER SPECIFIED POSTPROCEDURAL STATES: Chronic | ICD-10-CM

## 2024-06-13 DIAGNOSIS — E78.5 HYPERLIPIDEMIA, UNSPECIFIED: ICD-10-CM

## 2024-06-13 DIAGNOSIS — I10 ESSENTIAL (PRIMARY) HYPERTENSION: ICD-10-CM

## 2024-06-13 PROCEDURE — 99214 OFFICE O/P EST MOD 30 MIN: CPT

## 2024-06-13 PROCEDURE — G0463: CPT

## 2024-06-13 NOTE — ASSESSMENT
[FreeTextEntry1] : Mr. JESE LAGUNAS is a 49 year old Other race male with history of overweight BMI, well controlled T2DM/prediabetes, HLD, Vit D deficiency presented today for f/u.  # poorly controlled T2DM Our POCT A1C machine is out of order.  Sent pt for A1C, BMP and lipid profile.  Stressed healthy meals and regular exercise and lose weight.  Sent task to JOAQUIN Naidu to call pt back.  Continue take Metformin 500mg 2tab po bid, Rosuvastatin 10mg po qd.   # vit D deficiency Sent rx for Vit D 2000IU po qd.  Recommended calcium 600mg po qd.   f/u result. RTO with clinic in 3months for DM.

## 2024-06-13 NOTE — PHYSICAL EXAM
[de-identified] : WDWN in NAD HEENT:  unremarkable Neck:  supple, no JVD, no LN Lungs:  CTA B/L, no W/R/R Heart:  Reg rate, +S1S2, no M/R/G Abdomen:  soft, NT, ND, +BS, no masses, no HS-megaly Genital: No pubic or genital lesions noted. Ext:  no C/C/E Neuro:  no focal deficits

## 2024-06-13 NOTE — HISTORY OF PRESENT ILLNESS
[FreeTextEntry1] : f/u [de-identified] : Mr. JESE LAGUNAS is a 49 year old Other race male with history of overweight BMI, well controlled T2DM/prediabetes, HLD, Vit D deficiency presented today for f/u. Last seen by me 2/20/24 for CPE.  2/20/24 A1C noted sudden increase to 11.8, serum glucose 326 with severe dry mouth, thirst. Started Metformin and currently takes Metformin 500mg 2tab po bid without n/v/d/c. Reports felt less thirst. He changed his diet a lot: cut down soda, instant food. He increased vegetables and healthy diet. He chose water melon which is not recommended to DM pt. He could not get in touch with our dietician yet. He tries increase physical exercise. He takes Rosuvastatin 10mg po qd without muscle pain. He did not start vitamin D3 yet as he thought it was not good for his health.  Denied fever, chills,CP, SOB, abdominal pain, n/v/c/d.

## 2024-06-13 NOTE — COUNSELING
[Potential consequences of obesity discussed] : Potential consequences of obesity discussed [Encouraged to maintain food diary] : Encouraged to maintain food diary [Encouraged to increase physical activity] : Encouraged to increase physical activity [Encouraged to use exercise tracking device] : Encouraged to use exercise tracking device [Weigh Self Weekly] : weigh self weekly

## 2024-06-14 LAB
ANION GAP SERPL CALC-SCNC: 15 MMOL/L
BUN SERPL-MCNC: 17 MG/DL
CALCIUM SERPL-MCNC: 10.1 MG/DL
CHLORIDE SERPL-SCNC: 101 MMOL/L
CHOLEST SERPL-MCNC: 196 MG/DL
CO2 SERPL-SCNC: 22 MMOL/L
CREAT SERPL-MCNC: 0.7 MG/DL
EGFR: 114 ML/MIN/1.73M2
ESTIMATED AVERAGE GLUCOSE: 134 MG/DL
GLUCOSE SERPL-MCNC: 94 MG/DL
HBA1C MFR BLD HPLC: 6.3 %
HDLC SERPL-MCNC: 40 MG/DL
LDLC SERPL CALC-MCNC: 105 MG/DL
NONHDLC SERPL-MCNC: 156 MG/DL
POTASSIUM SERPL-SCNC: 4.3 MMOL/L
SODIUM SERPL-SCNC: 139 MMOL/L
TRIGL SERPL-MCNC: 299 MG/DL

## 2024-06-24 DIAGNOSIS — E11.9 TYPE 2 DIABETES MELLITUS WITHOUT COMPLICATIONS: ICD-10-CM

## 2024-06-24 DIAGNOSIS — E55.9 VITAMIN D DEFICIENCY, UNSPECIFIED: ICD-10-CM

## 2024-06-24 DIAGNOSIS — E78.5 HYPERLIPIDEMIA, UNSPECIFIED: ICD-10-CM

## 2024-07-11 ENCOUNTER — NON-APPOINTMENT (OUTPATIENT)
Age: 49
End: 2024-07-11

## 2024-07-11 ENCOUNTER — APPOINTMENT (OUTPATIENT)
Dept: OPHTHALMOLOGY | Facility: CLINIC | Age: 49
End: 2024-07-11
Payer: COMMERCIAL

## 2024-07-11 PROCEDURE — 92012 INTRM OPH EXAM EST PATIENT: CPT

## 2024-09-17 ENCOUNTER — OUTPATIENT (OUTPATIENT)
Dept: OUTPATIENT SERVICES | Facility: HOSPITAL | Age: 49
LOS: 1 days | End: 2024-09-17
Payer: COMMERCIAL

## 2024-09-17 ENCOUNTER — APPOINTMENT (OUTPATIENT)
Dept: INTERNAL MEDICINE | Facility: CLINIC | Age: 49
End: 2024-09-17
Payer: COMMERCIAL

## 2024-09-17 VITALS
OXYGEN SATURATION: 98 % | SYSTOLIC BLOOD PRESSURE: 120 MMHG | BODY MASS INDEX: 25.84 KG/M2 | DIASTOLIC BLOOD PRESSURE: 90 MMHG | WEIGHT: 195 LBS | HEIGHT: 73 IN | HEART RATE: 90 BPM

## 2024-09-17 DIAGNOSIS — Z98.890 OTHER SPECIFIED POSTPROCEDURAL STATES: Chronic | ICD-10-CM

## 2024-09-17 DIAGNOSIS — I10 ESSENTIAL (PRIMARY) HYPERTENSION: ICD-10-CM

## 2024-09-17 DIAGNOSIS — I83.90 ASYMPTOMATIC VARICOSE VEINS OF UNSPECIFIED LOWER EXTREMITY: ICD-10-CM

## 2024-09-17 DIAGNOSIS — E78.5 HYPERLIPIDEMIA, UNSPECIFIED: ICD-10-CM

## 2024-09-17 PROCEDURE — 99214 OFFICE O/P EST MOD 30 MIN: CPT

## 2024-09-17 PROCEDURE — G0463: CPT

## 2024-09-17 PROCEDURE — G2211 COMPLEX E/M VISIT ADD ON: CPT

## 2024-09-18 ENCOUNTER — NON-APPOINTMENT (OUTPATIENT)
Age: 49
End: 2024-09-18

## 2024-09-18 LAB
ANION GAP SERPL CALC-SCNC: 12 MMOL/L
BUN SERPL-MCNC: 16 MG/DL
CALCIUM SERPL-MCNC: 9.1 MG/DL
CHLORIDE SERPL-SCNC: 106 MMOL/L
CHOLEST SERPL-MCNC: 209 MG/DL
CO2 SERPL-SCNC: 23 MMOL/L
CREAT SERPL-MCNC: 0.66 MG/DL
EGFR: 116 ML/MIN/1.73M2
ESTIMATED AVERAGE GLUCOSE: 126 MG/DL
GLUCOSE SERPL-MCNC: 109 MG/DL
HBA1C MFR BLD HPLC: 6 %
HDLC SERPL-MCNC: 38 MG/DL
LDLC SERPL CALC-MCNC: 143 MG/DL
NONHDLC SERPL-MCNC: 170 MG/DL
POTASSIUM SERPL-SCNC: 4.5 MMOL/L
SODIUM SERPL-SCNC: 141 MMOL/L
TRIGL SERPL-MCNC: 149 MG/DL

## 2024-09-24 PROBLEM — I83.90 SPIDER VEIN OF LOWER EXTREMITY: Status: ACTIVE | Noted: 2024-09-24

## 2024-09-24 NOTE — PHYSICAL EXAM
[de-identified] : WDWN in NAD HEENT:  unremarkable Neck:  supple, no JVD, no LN Lungs:  CTA B/L, no W/R/R Heart:  Reg rate, +S1S2, no M/R/G Abdomen:  soft, NT, ND, +BS, no masses, no HS-megaly Genital: No pubic or genital lesions noted. Ext:  no C/C/E Neuro:  no focal deficits

## 2024-09-24 NOTE — HISTORY OF PRESENT ILLNESS
[FreeTextEntry1] : f/u [de-identified] : Mr. JESE LAGUNAS is a 49 year old Other race male with history of overweight BMI, well controlled T2DM/prediabetes, HLD, Vit D deficiency presented today for f/u. Last seen by me 6/13/24.  He expressed big concerns on his DM as his sister has severe diabetes foot infection.  He tells me that he significantly cut down instant food intake such as Hamburgers, soda, red meats.  He worries about his Mild b/l ankle area's spider veins He works a lot for family support( 2 little daughters age 1 and 3 years old). He reports taking Metformin 1000mg po bid without GI issues.  Taking Rosuvastatin 10mg po qd. His cousin in his country is a physician and told him to take  some medication for  high Triglyceride. Denied fever, chills,CP, SOB, abdominal pain, n/v/c/d.

## 2024-09-24 NOTE — HISTORY OF PRESENT ILLNESS
[FreeTextEntry1] : f/u [de-identified] : Mr. JESE LAGUNAS is a 49 year old Other race male with history of overweight BMI, well controlled T2DM/prediabetes, HLD, Vit D deficiency presented today for f/u. Last seen by me 6/13/24.  He expressed big concerns on his DM as his sister has severe diabetes foot infection.  He tells me that he significantly cut down instant food intake such as Hamburgers, soda, red meats.  He worries about his Mild b/l ankle area's spider veins He works a lot for family support( 2 little daughters age 1 and 3 years old). He reports taking Metformin 1000mg po bid without GI issues.  Taking Rosuvastatin 10mg po qd. His cousin in his country is a physician and told him to take  some medication for  high Triglyceride. Denied fever, chills,CP, SOB, abdominal pain, n/v/c/d.

## 2024-09-24 NOTE — PHYSICAL EXAM
[de-identified] : WDWN in NAD HEENT:  unremarkable Neck:  supple, no JVD, no LN Lungs:  CTA B/L, no W/R/R Heart:  Reg rate, +S1S2, no M/R/G Abdomen:  soft, NT, ND, +BS, no masses, no HS-megaly Genital: No pubic or genital lesions noted. Ext:  no C/C/E Neuro:  no focal deficits

## 2024-09-24 NOTE — ASSESSMENT
[FreeTextEntry1] : Mr. JESE LAGUNAS is a 49 year old Other race male with history of overweight BMI, T2DM, HLD, Vit D deficiency presented today for f/u.  # poorly controlled T2DM A1C 11.8 2/20/24 --> A1C 6.3% on 6/13/24 improve greatly! -He saw ophthalmologist 7/11/24: no retinopathy. Rx for eyeglasses updated.  -Foot exam today normal. some fungal toenails. Otherwise normal. Gave him podiatrist name for foot care.  Stressed healthy meals and regular exercise and lose weight. Sent task to JOAQUIN Naidu to call pt back. Continue take Metformin 500mg 2tab po bid, Rosuvastatin 10mg po qd. Sent pt for A1C, BMP and lipid profile.  # dyslipidemia 6/13/24 ,  If today's TG remain high, will start Icosapent Ethyl( EPA).   # b/l spider veins on ankles Encouraged compression stockings and elevated legs when rest.  Explained on Varicose vein's treatment options.   Encouraged pt to make patient portal access for lab results review and communication with providers.  f/u result. RTO in 3months for DM.

## 2024-09-25 ENCOUNTER — OUTPATIENT (OUTPATIENT)
Dept: OUTPATIENT SERVICES | Facility: HOSPITAL | Age: 49
LOS: 1 days | End: 2024-09-25

## 2024-09-25 ENCOUNTER — APPOINTMENT (OUTPATIENT)
Dept: WOUND CARE | Facility: HOSPITAL | Age: 49
End: 2024-09-25

## 2024-09-25 ENCOUNTER — APPOINTMENT (OUTPATIENT)
Dept: WOUND CARE | Facility: HOSPITAL | Age: 49
End: 2024-09-25
Payer: COMMERCIAL

## 2024-09-25 VITALS — TEMPERATURE: 98.1 F | SYSTOLIC BLOOD PRESSURE: 128 MMHG | DIASTOLIC BLOOD PRESSURE: 87 MMHG | HEART RATE: 70 BPM

## 2024-09-25 DIAGNOSIS — E11.9 TYPE 2 DIABETES MELLITUS W/OUT COMPLICATIONS: ICD-10-CM

## 2024-09-25 DIAGNOSIS — Z83.3 FAMILY HISTORY OF DIABETES MELLITUS: ICD-10-CM

## 2024-09-25 DIAGNOSIS — K42.9 UMBILICAL HERNIA WITHOUT OBSTRUCTION OR GANGRENE: Chronic | ICD-10-CM

## 2024-09-25 DIAGNOSIS — Z98.890 OTHER SPECIFIED POSTPROCEDURAL STATES: Chronic | ICD-10-CM

## 2024-09-25 DIAGNOSIS — I83.93 ASYMPTOMATIC VARICOSE VEINS OF BILATERAL LOWER EXTREMITIES: ICD-10-CM

## 2024-09-25 PROCEDURE — 99205 OFFICE O/P NEW HI 60 MIN: CPT

## 2024-09-25 PROCEDURE — 93970 EXTREMITY STUDY: CPT | Mod: 26

## 2024-09-25 PROCEDURE — 93923 UPR/LXTR ART STDY 3+ LVLS: CPT | Mod: 26

## 2024-09-26 NOTE — PLAN
[FreeTextEntry1] : 9/25/24 -Educated on importance of diet in controlling diabetes -Rx given for compression socks -Continue leg elevation at rest -Follow up for VLE  SAMAN/PVR completed today and results discussed with patient -wnl Venous reflux study completed demonstrating deep venous insufficiency Referral to vascular surgery regarding deep venous insufficiency Patient scheduled for Iliac testing

## 2024-09-26 NOTE — HISTORY OF PRESENT ILLNESS
[FreeTextEntry1] : Mr. JESE LAGUNAS   presents to the office with complaints of varicose veins to his bilateral legs. The patient does not have any wounds.  The patient has complaints of random shooting pains in his legs and feet. The patient was referred by his PCP Dr. Chaudhry. The patient has no other complaints or associated symptoms.  HbA1c is 6.0% taken on 9/17/24. He is an employee of [employer] and stands most of the day working for over a decade.

## 2024-09-26 NOTE — HISTORY OF PRESENT ILLNESS
[FreeTextEntry1] : Mr. JESE LAGUNAS   presents to the office with complaints of varicose veins to his bilateral legs. The patient does not have any wounds.  The patient has complaints of random shooting pains in his legs and feet. The patient was referred by his PCP Dr. Chaudhry. The patient has no other complaints or associated symptoms.  HbA1c is 6.0% taken on 9/17/24. He is an employee of [employer] and stands most of the day working for over a decade.

## 2024-09-26 NOTE — ASSESSMENT
[FreeTextEntry1] : 9/25/24 PVR performed in office today. Right side 1.2 / Left side 1.3 Varicosities noted in bilateral legs Patient measured for compression socks today  Measurements:  Right leg- Ankle: 23.5cm, Calf: 37cm, Length 41cm Left leg- Ankle: 23cm, Calf: 36cm, Length 41cm

## 2024-09-26 NOTE — PHYSICAL EXAM
[Normal Breath Sounds] : Normal breath sounds [JVD] : no jugular venous distention  [de-identified] : NAD, ambulatory, [de-identified] : AT [de-identified] : supple

## 2024-09-26 NOTE — PHYSICAL EXAM
[Normal Breath Sounds] : Normal breath sounds [JVD] : no jugular venous distention  [de-identified] : NAD, ambulatory, [de-identified] : AT [de-identified] : supple

## 2024-09-27 ENCOUNTER — NON-APPOINTMENT (OUTPATIENT)
Age: 49
End: 2024-09-27

## 2024-09-30 ENCOUNTER — OUTPATIENT (OUTPATIENT)
Dept: OUTPATIENT SERVICES | Facility: HOSPITAL | Age: 49
LOS: 1 days | End: 2024-09-30

## 2024-09-30 ENCOUNTER — APPOINTMENT (OUTPATIENT)
Dept: WOUND CARE | Facility: HOSPITAL | Age: 49
End: 2024-09-30

## 2024-09-30 DIAGNOSIS — Z98.890 OTHER SPECIFIED POSTPROCEDURAL STATES: Chronic | ICD-10-CM

## 2024-09-30 DIAGNOSIS — I83.90 ASYMPTOMATIC VARICOSE VEINS OF UNSPECIFIED LOWER EXTREMITY: ICD-10-CM

## 2024-09-30 DIAGNOSIS — K42.9 UMBILICAL HERNIA WITHOUT OBSTRUCTION OR GANGRENE: Chronic | ICD-10-CM

## 2024-09-30 DIAGNOSIS — E78.5 HYPERLIPIDEMIA, UNSPECIFIED: ICD-10-CM

## 2024-09-30 DIAGNOSIS — E11.9 TYPE 2 DIABETES MELLITUS WITHOUT COMPLICATIONS: ICD-10-CM

## 2024-09-30 PROCEDURE — 93978 VASCULAR STUDY: CPT | Mod: 26

## 2024-10-04 ENCOUNTER — NON-APPOINTMENT (OUTPATIENT)
Age: 49
End: 2024-10-04

## 2024-10-04 DIAGNOSIS — I87.2 VENOUS INSUFFICIENCY (CHRONIC) (PERIPHERAL): ICD-10-CM

## 2024-10-08 DIAGNOSIS — R60.0 LOCALIZED EDEMA: ICD-10-CM

## 2024-10-16 ENCOUNTER — APPOINTMENT (OUTPATIENT)
Dept: VASCULAR SURGERY | Facility: CLINIC | Age: 49
End: 2024-10-16
Payer: COMMERCIAL

## 2024-10-16 VITALS
WEIGHT: 195 LBS | HEIGHT: 73 IN | TEMPERATURE: 98 F | BODY MASS INDEX: 25.84 KG/M2 | HEART RATE: 71 BPM | SYSTOLIC BLOOD PRESSURE: 121 MMHG | DIASTOLIC BLOOD PRESSURE: 87 MMHG

## 2024-10-16 PROCEDURE — 99213 OFFICE O/P EST LOW 20 MIN: CPT

## 2024-12-11 ENCOUNTER — APPOINTMENT (OUTPATIENT)
Dept: INTERNAL MEDICINE | Facility: CLINIC | Age: 49
End: 2024-12-11

## 2024-12-17 ENCOUNTER — APPOINTMENT (OUTPATIENT)
Dept: INTERNAL MEDICINE | Facility: CLINIC | Age: 49
End: 2024-12-17
Payer: COMMERCIAL

## 2024-12-17 ENCOUNTER — OUTPATIENT (OUTPATIENT)
Dept: OUTPATIENT SERVICES | Facility: HOSPITAL | Age: 49
LOS: 1 days | End: 2024-12-17
Payer: COMMERCIAL

## 2024-12-17 VITALS
BODY MASS INDEX: 27.17 KG/M2 | HEART RATE: 91 BPM | SYSTOLIC BLOOD PRESSURE: 120 MMHG | OXYGEN SATURATION: 97 % | HEIGHT: 73 IN | WEIGHT: 205 LBS | DIASTOLIC BLOOD PRESSURE: 80 MMHG

## 2024-12-17 DIAGNOSIS — E11.9 TYPE 2 DIABETES MELLITUS W/OUT COMPLICATIONS: ICD-10-CM

## 2024-12-17 DIAGNOSIS — K42.9 UMBILICAL HERNIA WITHOUT OBSTRUCTION OR GANGRENE: Chronic | ICD-10-CM

## 2024-12-17 DIAGNOSIS — Z98.890 OTHER SPECIFIED POSTPROCEDURAL STATES: Chronic | ICD-10-CM

## 2024-12-17 DIAGNOSIS — E78.5 HYPERLIPIDEMIA, UNSPECIFIED: ICD-10-CM

## 2024-12-17 DIAGNOSIS — I10 ESSENTIAL (PRIMARY) HYPERTENSION: ICD-10-CM

## 2024-12-17 LAB
GLUCOSE BLDC GLUCOMTR-MCNC: 148
HBA1C MFR BLD HPLC: 6.3

## 2024-12-17 PROCEDURE — 82948 REAGENT STRIP/BLOOD GLUCOSE: CPT | Mod: 25

## 2024-12-17 PROCEDURE — 82962 GLUCOSE BLOOD TEST: CPT

## 2024-12-17 PROCEDURE — G0463: CPT

## 2024-12-17 PROCEDURE — 90656 IIV3 VACC NO PRSV 0.5 ML IM: CPT

## 2024-12-17 PROCEDURE — G2211 COMPLEX E/M VISIT ADD ON: CPT

## 2024-12-17 PROCEDURE — 83036 HEMOGLOBIN GLYCOSYLATED A1C: CPT

## 2024-12-17 PROCEDURE — 99214 OFFICE O/P EST MOD 30 MIN: CPT

## 2024-12-17 PROCEDURE — G0008: CPT

## 2024-12-17 RX ORDER — BLOOD-GLUCOSE METER
W/DEVICE EACH MISCELLANEOUS
Qty: 1 | Refills: 0 | Status: ACTIVE | COMMUNITY
Start: 2024-12-17 | End: 1900-01-01

## 2024-12-17 RX ORDER — BLOOD SUGAR DIAGNOSTIC
STRIP MISCELLANEOUS
Qty: 1 | Refills: 3 | Status: ACTIVE | COMMUNITY
Start: 2024-12-17 | End: 1900-01-01

## 2024-12-17 RX ORDER — 70%ISOPROPYL ALCOHOL 0.7 ML/ML
70 SWAB TOPICAL
Qty: 1 | Refills: 3 | Status: ACTIVE | COMMUNITY
Start: 2024-12-17 | End: 1900-01-01

## 2024-12-17 RX ORDER — BLOOD-GLUCOSE METER
EACH MISCELLANEOUS
Qty: 1 | Refills: 0 | Status: ACTIVE | COMMUNITY
Start: 2024-12-17 | End: 1900-01-01

## 2024-12-17 RX ORDER — LANCETS 30 GAUGE
EACH MISCELLANEOUS
Qty: 1 | Refills: 3 | Status: ACTIVE | COMMUNITY
Start: 2024-12-17 | End: 1900-01-01

## 2024-12-23 DIAGNOSIS — E11.9 TYPE 2 DIABETES MELLITUS WITHOUT COMPLICATIONS: ICD-10-CM

## 2024-12-23 DIAGNOSIS — E78.5 HYPERLIPIDEMIA, UNSPECIFIED: ICD-10-CM

## 2024-12-23 DIAGNOSIS — Z23 ENCOUNTER FOR IMMUNIZATION: ICD-10-CM

## 2025-02-26 ENCOUNTER — OUTPATIENT (OUTPATIENT)
Dept: OUTPATIENT SERVICES | Facility: HOSPITAL | Age: 50
LOS: 1 days | End: 2025-02-26

## 2025-02-26 ENCOUNTER — APPOINTMENT (OUTPATIENT)
Dept: INTERNAL MEDICINE | Facility: CLINIC | Age: 50
End: 2025-02-26

## 2025-02-26 VITALS
DIASTOLIC BLOOD PRESSURE: 80 MMHG | HEIGHT: 73 IN | BODY MASS INDEX: 26.51 KG/M2 | WEIGHT: 200 LBS | SYSTOLIC BLOOD PRESSURE: 110 MMHG | OXYGEN SATURATION: 98 % | HEART RATE: 80 BPM

## 2025-02-26 DIAGNOSIS — E55.9 VITAMIN D DEFICIENCY, UNSPECIFIED: ICD-10-CM

## 2025-02-26 DIAGNOSIS — K42.9 UMBILICAL HERNIA WITHOUT OBSTRUCTION OR GANGRENE: Chronic | ICD-10-CM

## 2025-02-26 DIAGNOSIS — Z13.39 ENCOUNTER FOR SCREENING EXAM FOR OTHER MENTAL HEALTH AND BEHAVIORAL DISORDERS: ICD-10-CM

## 2025-02-26 DIAGNOSIS — Z98.890 OTHER SPECIFIED POSTPROCEDURAL STATES: Chronic | ICD-10-CM

## 2025-02-26 DIAGNOSIS — Z00.00 ENCOUNTER FOR GENERAL ADULT MEDICAL EXAMINATION W/OUT ABNORMAL FINDINGS: ICD-10-CM

## 2025-02-26 DIAGNOSIS — E78.5 HYPERLIPIDEMIA, UNSPECIFIED: ICD-10-CM

## 2025-02-26 DIAGNOSIS — Z13.31 ENCOUNTER FOR SCREENING FOR DEPRESSION: ICD-10-CM

## 2025-02-26 DIAGNOSIS — E11.9 TYPE 2 DIABETES MELLITUS W/OUT COMPLICATIONS: ICD-10-CM

## 2025-02-26 LAB
25(OH)D3 SERPL-MCNC: 19.5 NG/ML
ALBUMIN SERPL ELPH-MCNC: 4.7 G/DL
ALP BLD-CCNC: 87 U/L
ALT SERPL-CCNC: 17 U/L
ANION GAP SERPL CALC-SCNC: 17 MMOL/L
AST SERPL-CCNC: 22 U/L
BASOPHILS # BLD AUTO: 0.02 K/UL
BASOPHILS NFR BLD AUTO: 0.4 %
BILIRUB SERPL-MCNC: 0.5 MG/DL
BUN SERPL-MCNC: 20 MG/DL
CALCIUM SERPL-MCNC: 9.5 MG/DL
CHLORIDE SERPL-SCNC: 102 MMOL/L
CHOLEST SERPL-MCNC: 213 MG/DL
CO2 SERPL-SCNC: 23 MMOL/L
CREAT SERPL-MCNC: 0.69 MG/DL
CREAT SPEC-SCNC: 107 MG/DL
EGFR: 114 ML/MIN/1.73M2
EOSINOPHIL # BLD AUTO: 0.13 K/UL
EOSINOPHIL NFR BLD AUTO: 2.5 %
ESTIMATED AVERAGE GLUCOSE: 140 MG/DL
GLUCOSE SERPL-MCNC: 94 MG/DL
HBA1C MFR BLD HPLC: 6.5 %
HCT VFR BLD CALC: 46.3 %
HDLC SERPL-MCNC: 41 MG/DL
HGB BLD-MCNC: 15.3 G/DL
IMM GRANULOCYTES NFR BLD AUTO: 0.4 %
LDLC SERPL CALC-MCNC: 141 MG/DL
LYMPHOCYTES # BLD AUTO: 1.54 K/UL
LYMPHOCYTES NFR BLD AUTO: 30 %
MAN DIFF?: NORMAL
MCHC RBC-ENTMCNC: 29 PG
MCHC RBC-ENTMCNC: 33 G/DL
MCV RBC AUTO: 87.7 FL
MICROALBUMIN 24H UR DL<=1MG/L-MCNC: <1.2 MG/DL
MICROALBUMIN/CREAT 24H UR-RTO: NORMAL MG/G
MONOCYTES # BLD AUTO: 0.44 K/UL
MONOCYTES NFR BLD AUTO: 8.6 %
NEUTROPHILS # BLD AUTO: 2.99 K/UL
NEUTROPHILS NFR BLD AUTO: 58.1 %
NONHDLC SERPL-MCNC: 172 MG/DL
PLATELET # BLD AUTO: 258 K/UL
POTASSIUM SERPL-SCNC: 4.6 MMOL/L
PROT SERPL-MCNC: 6.8 G/DL
RBC # BLD: 5.28 M/UL
RBC # FLD: 12.4 %
SODIUM SERPL-SCNC: 142 MMOL/L
T4 FREE SERPL-MCNC: 1.1 NG/DL
TRIGL SERPL-MCNC: 172 MG/DL
TSH SERPL-ACNC: 3.1 UIU/ML
WBC # FLD AUTO: 5.14 K/UL

## 2025-02-26 PROCEDURE — 99396 PREV VISIT EST AGE 40-64: CPT

## 2025-02-26 PROCEDURE — G0463: CPT

## 2025-02-26 PROCEDURE — G0442 ANNUAL ALCOHOL SCREEN 15 MIN: CPT | Mod: 59

## 2025-02-26 PROCEDURE — G0444 DEPRESSION SCREEN ANNUAL: CPT | Mod: 59

## 2025-03-28 DIAGNOSIS — I10 ESSENTIAL (PRIMARY) HYPERTENSION: ICD-10-CM

## (undated) DEVICE — PACK IV START WITH CHG

## (undated) DEVICE — SOL IRR POUR NS 0.9% 500ML

## (undated) DEVICE — DRSG STERISTRIPS 0.25 X 3"

## (undated) DEVICE — TUBING SUCTION CONN 6FT STERILE

## (undated) DEVICE — VENODYNE/SCD SLEEVE CALF MEDIUM

## (undated) DEVICE — PACK MINOR NO DRAPE

## (undated) DEVICE — SUCTION YANKAUER NO CONTROL VENT

## (undated) DEVICE — POLY TRAP ETRAP

## (undated) DEVICE — FORCEP RADIAL JAW 4 JUMBO 2.8MM 3.2MM 240CM ORANGE DISP

## (undated) DEVICE — GLV 7.5 PROTEXIS (WHITE)

## (undated) DEVICE — GLV 8 PROTEXIS (CREAM) NEU-THERA

## (undated) DEVICE — FOLEY HOLDER STATLOCK 2 WAY ADULT

## (undated) DEVICE — SYR LUER LOK 50CC

## (undated) DEVICE — SUT MONOCRYL 4-0 27" PS-2 UNDYED

## (undated) DEVICE — CLAMP BX HOT RAD JAW 3

## (undated) DEVICE — TUBING SUCTION NONCONDUCTIVE 6MM X 12FT

## (undated) DEVICE — SUT VICRYL 3-0 27" SH UNDYED

## (undated) DEVICE — SENSOR O2 FINGER ADULT 24/CA

## (undated) DEVICE — IRRIGATOR BIO SHIELD

## (undated) DEVICE — POSITIONER STRAP ARMBOARD VELCRO TS-30

## (undated) DEVICE — BITE BLOCK ADULT 20 X 27MM (GREEN)

## (undated) DEVICE — ELCTR GROUNDING PAD ADULT COVIDIEN

## (undated) DEVICE — CATH IV SAFE BC 20G X 1.16" (PINK)

## (undated) DEVICE — CANISTER DISPOSABLE THIN WALL 3000CC

## (undated) DEVICE — CAM-ESU 1501230: Type: DURABLE MEDICAL EQUIPMENT

## (undated) DEVICE — BIOPSY FORCEP RADIAL JAW 4 STANDARD WITH NEEDLE

## (undated) DEVICE — SYR ALLIANCE II INFLATION 60ML

## (undated) DEVICE — CATH IV SAFE BC 22G X 1" (BLUE)

## (undated) DEVICE — BALLOON US ENDO

## (undated) DEVICE — DRSG DERMABOND 0.7ML

## (undated) DEVICE — SOL INJ NS 0.9% 500ML 2 PORT

## (undated) DEVICE — DRAPE LAPAROTOMY TRANSVERSE

## (undated) DEVICE — TUBING IV SET GRAVITY 3Y 100" MACRO

## (undated) DEVICE — BRUSH COLONOSCOPY CYTOLOGY

## (undated) DEVICE — WARMING BLANKET FULL ADULT

## (undated) DEVICE — TUBING SUCTION 20FT